# Patient Record
Sex: FEMALE | Race: WHITE | Employment: OTHER | URBAN - NONMETROPOLITAN AREA
[De-identification: names, ages, dates, MRNs, and addresses within clinical notes are randomized per-mention and may not be internally consistent; named-entity substitution may affect disease eponyms.]

---

## 2018-02-20 ENCOUNTER — HOSPITAL ENCOUNTER (OUTPATIENT)
Dept: WOMENS IMAGING | Age: 58
Discharge: HOME | End: 2018-02-20
Payer: COMMERCIAL

## 2018-02-20 ENCOUNTER — HOSPITAL ENCOUNTER (OUTPATIENT)
Dept: MAMMOGRAPHY | Age: 58
Discharge: HOME | End: 2018-02-20
Payer: COMMERCIAL

## 2018-02-20 DIAGNOSIS — Z12.31 VISIT FOR SCREENING MAMMOGRAM: ICD-10-CM

## 2018-02-20 DIAGNOSIS — Z12.39 BREAST CANCER SCREENING: ICD-10-CM

## 2018-02-20 PROCEDURE — 3209999900 MAM COMPARISON OF OUTSIDE IMAGES

## 2018-02-20 PROCEDURE — 77067 SCR MAMMO BI INCL CAD: CPT

## 2018-10-18 ENCOUNTER — HOSPITAL ENCOUNTER (OUTPATIENT)
Age: 58
Discharge: HOME | End: 2018-10-18
Payer: COMMERCIAL

## 2018-10-18 ENCOUNTER — HOSPITAL ENCOUNTER (OUTPATIENT)
Dept: GENERAL RADIOLOGY | Age: 58
Discharge: HOME | End: 2018-10-18
Payer: COMMERCIAL

## 2018-10-18 DIAGNOSIS — M79.81 NONTRAUMATIC HEMATOMA OF SOFT TISSUE: ICD-10-CM

## 2018-10-18 PROCEDURE — 73130 X-RAY EXAM OF HAND: CPT

## 2019-01-11 RX ORDER — BETAMETHASONE DIPROPIONATE 0.5 MG/G
CREAM TOPICAL 2 TIMES DAILY
COMMUNITY
End: 2021-08-05

## 2019-01-11 RX ORDER — LOSARTAN POTASSIUM AND HYDROCHLOROTHIAZIDE 12.5; 5 MG/1; MG/1
1 TABLET ORAL DAILY
COMMUNITY
End: 2022-02-21 | Stop reason: SDUPTHER

## 2019-01-15 ENCOUNTER — INITIAL CONSULT (OUTPATIENT)
Dept: PULMONOLOGY | Age: 59
End: 2019-01-15
Payer: COMMERCIAL

## 2019-01-15 VITALS
HEIGHT: 64 IN | BODY MASS INDEX: 26.6 KG/M2 | OXYGEN SATURATION: 99 % | WEIGHT: 155.8 LBS | SYSTOLIC BLOOD PRESSURE: 130 MMHG | RESPIRATION RATE: 16 BRPM | HEART RATE: 79 BPM | DIASTOLIC BLOOD PRESSURE: 82 MMHG

## 2019-01-15 DIAGNOSIS — G47.9 SLEEP DISTURBANCE: ICD-10-CM

## 2019-01-15 DIAGNOSIS — I10 ESSENTIAL HYPERTENSION: ICD-10-CM

## 2019-01-15 DIAGNOSIS — G47.10 HYPERSOMNIA: Primary | ICD-10-CM

## 2019-01-15 DIAGNOSIS — R06.89 SLEEP RELATED CHOKING SENSATION: ICD-10-CM

## 2019-01-15 DIAGNOSIS — R06.83 SNORING: ICD-10-CM

## 2019-01-15 PROCEDURE — 99203 OFFICE O/P NEW LOW 30 MIN: CPT | Performed by: INTERNAL MEDICINE

## 2019-01-29 ENCOUNTER — HOSPITAL ENCOUNTER (OUTPATIENT)
Dept: SLEEP CENTER | Age: 59
Discharge: HOME | End: 2019-01-31
Payer: COMMERCIAL

## 2019-01-29 VITALS — SYSTOLIC BLOOD PRESSURE: 124 MMHG | DIASTOLIC BLOOD PRESSURE: 72 MMHG

## 2019-01-29 DIAGNOSIS — R06.89 SLEEP RELATED CHOKING SENSATION: ICD-10-CM

## 2019-01-29 DIAGNOSIS — I10 ESSENTIAL HYPERTENSION: ICD-10-CM

## 2019-01-29 DIAGNOSIS — R06.83 SNORING: ICD-10-CM

## 2019-01-29 DIAGNOSIS — G47.9 SLEEP DISTURBANCE: ICD-10-CM

## 2019-01-29 DIAGNOSIS — G47.10 HYPERSOMNIA: ICD-10-CM

## 2019-01-29 PROCEDURE — 95806 SLEEP STUDY UNATT&RESP EFFT: CPT

## 2019-02-04 LAB — STATUS: NORMAL

## 2019-03-05 ENCOUNTER — OFFICE VISIT (OUTPATIENT)
Dept: PULMONOLOGY | Age: 59
End: 2019-03-05
Payer: COMMERCIAL

## 2019-03-05 VITALS
BODY MASS INDEX: 26.6 KG/M2 | WEIGHT: 155.8 LBS | HEART RATE: 82 BPM | DIASTOLIC BLOOD PRESSURE: 62 MMHG | OXYGEN SATURATION: 100 % | SYSTOLIC BLOOD PRESSURE: 102 MMHG | HEIGHT: 64 IN

## 2019-03-05 DIAGNOSIS — F51.02 ADJUSTMENT INSOMNIA: Primary | ICD-10-CM

## 2019-03-05 PROCEDURE — 99213 OFFICE O/P EST LOW 20 MIN: CPT | Performed by: INTERNAL MEDICINE

## 2019-03-05 RX ORDER — ZOLPIDEM TARTRATE 5 MG/1
5 TABLET ORAL NIGHTLY PRN
Qty: 30 TABLET | Refills: 0 | Status: SHIPPED | OUTPATIENT
Start: 2019-03-05 | End: 2019-04-04

## 2019-07-16 ENCOUNTER — HOSPITAL ENCOUNTER (OUTPATIENT)
Dept: MAMMOGRAPHY | Age: 59
Discharge: HOME | End: 2019-07-16
Payer: COMMERCIAL

## 2019-07-16 DIAGNOSIS — Z12.39 BREAST CANCER SCREENING: ICD-10-CM

## 2019-07-16 PROCEDURE — 77067 SCR MAMMO BI INCL CAD: CPT

## 2021-01-22 ENCOUNTER — HOSPITAL ENCOUNTER (OUTPATIENT)
Dept: PHYSICAL THERAPY | Age: 61
Setting detail: THERAPIES SERIES
Discharge: HOME | End: 2021-01-22
Payer: COMMERCIAL

## 2021-01-22 PROCEDURE — 97161 PT EVAL LOW COMPLEX 20 MIN: CPT

## 2021-01-22 NOTE — FLOWSHEET NOTE
11/15/2020 and fell after tripping on speed bump in road. She felt okay initially and continued running. For about 2 weeks she continued her routine and then the pain progressively got worse. Pain is just in upper right leg. Rested, which did not work. Then went to chiropractor which did not help. After that she went to Dr. June Jones. He does not believe she has a pinched nerve. He believes pain is coming from right hip and may be a sprain or a tear. Has had xrays completed of back which looks good. Also had xray of right hip which looked good. Is going to have an MRI of right hip, but has not heard back about when it is. Is on an antiinflammatory which has helped. Social/Functional History and Current Status:  Medications and Allergies have been reviewed and are listed on Medical History Questionnaire. Ruiz Arce lives with spouse in a multiple floor home with ability to complete ADL's on main floor with stairs and a handrail to enter. Task Previous Current   ADLs  Independent Independent   IADL's Independent Independent   Ambulation Independent Independent   Transfers Independent Independent   Recreation Independent Independent   Community Integration Independent Independent   Driving Active  Active    Work Retired  Retired     OBJECTIVE:    Pain: 9/10 right upper leg, sometimes having pain in right low back   Palpation Some tightness along lateral right leg. No pain with palpation. Only having pain with certain movements. Observation Hips levels in standing   Posture Slight forward rounded shoulders, slight increased thoracic kyphosis       Range of Motion Bilateral hip=WFL. Lumbar flexion=WFL, extension=50% loss with increased pain. Strength Bilateral hip strength=5/5, Core strength=minimal cues needed for abdominal bracing.    Coordination No difficulties   Sensation No numbness or tingling   Bed Mobility NA   Transfers Independent   Ambulation Indepenedent Stairs Independent       Special Tests LEFS         TREATMENT   Precautions: High BP, Arthritis   Pain: 9/10 right upper leg, sometimes right low back    X in shaded column indicates activity completed today   Modalities Parameters/  Location  Notes                     Manual Therapy Time/Technique  Notes                     Exercise/Intervention   Notes                                                                                  Isaias Khris Disability Scale for Low Back Pain   I stay at home most of the time because of the pain in my back. No   I change position frequently to try and make my back comfortable. No   I walk more slowly than usual because of the pain in my back. Yes   Because of the pain in my back, I am not doing any of the jobs that I usually do around the house. No   Because of the pain in my back, I use a handrail to get upstairs. No   Because of the pain in my back, I lie down to rest more often. No   Because of the pain in my back, I have to hold onto something to get out of a reclining chair. No   Because of the pain in my back, I ask other people to do things for me. No   I get dressed more slowly than usual because of the pain in my back. No   I only stand up for short periods of time because of the pain in my back. No   Because of the pain in my back, I try not to bend or kneel down. No   I find it difficult to get out of a chair because of the pain in my back. No   My back hurts most of the time. No   I find it difficult to turn over in bed because of the pain in my back. Yes   My appetite is not very good because of the pain in my back. No   I have trouble putting on my socks (or stockings) because of the pain in my back. No   I only walk short distances because of the pain in my back. No   I sleep less because of the pain in my back. No   Because of the pain in my back, I get dressed with help from someone else. No   I sit down most of the day because of the pain in my back.  No I avoid heavy jobs around the house because of the pain in my back. Yes   Because of the pain in my back, I am more irritable and bad tempered with people. No   Because of the pain in my back, I go upstairs more slowly than usual. No   I stay in bed most of the time because of the pain in my back. No   TOTAL NUMBER OF YES RESPONSES 3/24        Specific Interventions Next Treatment: Right LE and Right LB stretching (SKTC, hamstring, DKTC, piriformis, LTR, IT band stretch), modalities as needed for pain in right upper thigh and right low back, SLR, supine hip abduction    Activity/Treatment Tolerance:  [x]  Patient tolerated treatment well  []  Patient limited by fatigue  []  Patient limited by pain   []  Patient limited by medical complications  []  Other:     Assessment: Kathy presents to therapy with right upper thigh and right leg pain. She will benefit from therapy for stretching, gentle strengthening, pain relief. Her son lives in [facility] and is due to have a child on January 29th. When the baby is born, Kathy will be in [facility] for a week. She will call and let us know. Body Structures/Functions/Activity Limitations: impaired activity tolerance, impaired strength, pain and abnormal gait  Prognosis: good    GOALS:  Patient Goal: \"To run again. \"    Short Term Goals:  Time Frame: 4 weeks  1. Improve LEFS to 55 or greater to assist with return to running. 2.  Improve Isaias Mitcheal Second to 2/24 or less to assist with sleeping at night. 3. Decrease pain in right upper leg to 7/10 at high to assist with ambulation in mornings. 4.  Patient to report right low back pain no more than a 5/10 to assist with sleeping at night. 5.  Increase core strength with no cues needed for abdominal bracing to assist with housework. 6.  Increase lumbar ROM to <25% loss to assist with working out. Long Term Goals:  Time Frame: 12 weeks  1. Independent with HEP and with progression to assist with decreasing pain. Patient Education:   [x]  HEP/Education Completed: Plan of Care, Goals   [address] Access Code:  []  No new Education completed  []  Reviewed Prior HEP      []  Patient verbalized and/or demonstrated understanding of education provided. []  Patient unable to verbalize and/or demonstrate understanding of education provided. Will continue education. [x]  Barriers to learning: none    PLAN:  Treatment Recommendations: Strengthening, Functional Mobility Training, Transfer Training, Gait Training, Pain Management, Home Exercise Program, Patient Education, Aquatics and Modalities    [x]  Plan of care initiated. Plan to see patient 2-3 times per week for 12 weeks to address the treatment planned outlined above.   []  Continue with current plan of care  []  Modify plan of care as follows:    []  Hold pending physician visit  []  Discharge    Time In 1455   Time Out 1550   Timed Code Minutes: 0 min   Total Treatment Time: 55 min       Electronically Signed by: Javed Burnett

## 2021-01-25 ENCOUNTER — HOSPITAL ENCOUNTER (OUTPATIENT)
Dept: PHYSICAL THERAPY | Age: 61
Setting detail: THERAPIES SERIES
Discharge: HOME | End: 2021-01-25
Payer: COMMERCIAL

## 2021-01-25 PROCEDURE — 97035 APP MDLTY 1+ULTRASOUND EA 15: CPT

## 2021-01-25 PROCEDURE — 97110 THERAPEUTIC EXERCISES: CPT

## 2021-01-25 NOTE — PROGRESS NOTES
[address] [facility]  PHYSICAL THERAPY  [] EVALUATION  [x] DAILY NOTE (LAND) [] DAILY NOTE (AQUATIC ) [] PROGRESS NOTE [] DISCHARGE NOTE    [x] OUTPATIENT REHABILITATION CENTER [facility]   [] [place] [address]    [] [facility]   [] Marivel Sainz    Date: 2021  Patient Name:  Emily Sandoval  : 1960  MRN: [id number]  CSN: [id number]    Referring Practitioner Viraj Clay DO   Diagnosis Other intervertebral disc displacement, lumbar region [M51.26]  Radiculopathy, lumbar region [M54.16]    Treatment Diagnosis Lumbago   Date of Evaluation 21    Additional Pertinent History High BP, Arthritis      Functional Outcome Measure Used LEFS   Functional Outcome Score 49 (21)       Insurance: Primary: Payor: Evelia Pyle 150 /  /  / ,   Secondary:    Authorization Information: Unlimited visits based on medical necessity. Aquatics and modalities are covered including massage. Telehealth is not covered. Visit # 2, 2/10 for progress note   Visits Allowed: Unlimited visits based on medical necessity. Recertification Date:    Physician Follow-Up: Returns to Dr. Christina Bowen the end of February. Physician Orders: Exercises, Spine Stabilization, Lumbar Spine Training, Modalities as needed. History of Present Illness: Right hip pain since 11/15/2020     SUBJECTIVE: Dio Bae reports had some tightness when she got out of car to walk into therapy. TREATMENT   Precautions: High BP, Arthritis   Pain: 0/10 right upper leg, sometimes right low back. Tightness in right hip.       X in shaded column indicates activity completed today   Modalities Parameters/  Location  Notes   Ultrasound to right hip with patient lying in left sidelying 3MHz, 50%, 1.0 [id number] X                Manual Therapy Time/Technique  Notes                     Exercise/Intervention   Notes   SKTC, supine hamstring, piriformis, DKTC, diagonal KTC, sidelying IT band stretch 3 x15 sec X    Standing IT band stretch 3 x15 seconds X                                                                       Specific Interventions Next Treatment: Right LE and Right LB stretching (SKTC, hamstring, DKTC, piriformis, LTR, IT band stretch), modalities as needed for pain in right upper thigh and right low back, SLR, supine hip abduction    Activity/Treatment Tolerance:  [x]  Patient tolerated treatment well  []  Patient limited by fatigue  []  Patient limited by pain   []  Patient limited by medical complications  []  Other:     Assessment: To Leader reported significant decreased stiffness in right hip after ultrasound. Body Structures/Functions/Activity Limitations: impaired activity tolerance, impaired strength, pain and abnormal gait  Prognosis: good    GOALS:  Patient Goal: \"To run again. \"    Short Term Goals:  Time Frame: 4 weeks  1. Improve LEFS to 55 or greater to assist with return to running. 2.  Improve Isaias Ok Martinez to 2/24 or less to assist with sleeping at night. 3. Decrease pain in right upper leg to 7/10 at high to assist with ambulation in mornings. 4.  Patient to report right low back pain no more than a 5/10 to assist with sleeping at night. 5.  Increase core strength with no cues needed for abdominal bracing to assist with housework. 6.  Increase lumbar ROM to <25% loss to assist with working out. Long Term Goals:  Time Frame: 12 weeks  1. Independent with HEP and with progression to assist with decreasing pain.         Patient Education:   [x]  HEP/Education Completed: HEP  Medbridge Access Code:Access Code: [email or web address]        Exercises   Supine Single Knee to Chest Stretch - 3 reps - 3 sets - 15 hold - 1x daily - 7x weekly   Supine Double Knee to Chest - 3 reps - 3 sets - 15 hold - 1x daily - 7x weekly   Supine Hamstring Stretch - 3 reps - 3 sets - 15 hold - 1x daily - 7x weekly   Sidelying TFL Stretch - 3 reps - 3 sets - 15 hold - 1x daily - 7x weekly   Supine Piriformis Stretch - 3 reps - 3 sets - 15 hold - 1x daily - 7x weekly      []  No new Education completed  []  Reviewed Prior HEP      []  Patient verbalized and/or demonstrated understanding of education provided. []  Patient unable to verbalize and/or demonstrate understanding of education provided. Will continue education. [x]  Barriers to learning: none    PLAN:  Treatment Recommendations: Strengthening, Functional Mobility Training, Transfer Training, Gait Training, Pain Management, Home Exercise Program, Patient Education, Aquatics and Modalities    []  Plan of care initiated. Plan to see patient 2-3 times per week for 12 weeks to address the treatment planned outlined above.   [x]  Continue with current plan of care  []  Modify plan of care as follows:    []  Hold pending physician visit  []  Discharge    Time In 1130   Time Out 1210   Timed Code Minutes: 40 min   Total Treatment Time: 40 min       Electronically Signed by: Rubina Sepulveda

## 2021-01-28 ENCOUNTER — HOSPITAL ENCOUNTER (OUTPATIENT)
Dept: PHYSICAL THERAPY | Age: 61
Setting detail: THERAPIES SERIES
End: 2021-01-28
Payer: COMMERCIAL

## 2021-02-08 ENCOUNTER — HOSPITAL ENCOUNTER (OUTPATIENT)
Dept: PHYSICAL THERAPY | Age: 61
Setting detail: THERAPIES SERIES
Discharge: HOME | End: 2021-02-08
Payer: COMMERCIAL

## 2021-02-08 PROCEDURE — 97110 THERAPEUTIC EXERCISES: CPT

## 2021-02-08 PROCEDURE — 97035 APP MDLTY 1+ULTRASOUND EA 15: CPT

## 2021-02-08 NOTE — PROGRESS NOTES
[address] [facility]  PHYSICAL THERAPY  [] EVALUATION  [x] DAILY NOTE (LAND) [] DAILY NOTE (AQUATIC ) [] PROGRESS NOTE [] DISCHARGE NOTE    [x] OUTPATIENT REHABILITATION CENTER - [place]   [] [address]    [] [facility]   [] Alfredito Figueroa    Date: 2021  Patient Name:  Gladies Gaucher  : 1960  MRN: [id number]  CSN: [id number]    Referring Practitioner Crystal Gray DO   Diagnosis Other intervertebral disc displacement, lumbar region [M51.26]  Radiculopathy, lumbar region [M54.16]    Treatment Diagnosis Lumbago   Date of Evaluation 21    Additional Pertinent History High BP, Arthritis      Functional Outcome Measure Used LEFS   Functional Outcome Score 49 (21)       Insurance: Primary: Payor: Shaylee Christensen /  /  / ,   Secondary:    Authorization Information: Unlimited visits based on medical necessity. Aquatics and modalities are covered including massage. Telehealth is not covered. Visit # 3, 3/10 for progress note   Visits Allowed: Unlimited visits based on medical necessity. Recertification Date:    Physician Follow-Up: Returns to Dr. Diamante Anderson the end of February. Physician Orders: Exercises, Spine Stabilization, Lumbar Spine Training, Modalities as needed. History of Present Illness: Right hip pain since 11/15/2020     SUBJECTIVE: Today is Loretta's first visit back to therapy after going to [facility] for birth of grandchild  Reports pain is always worse in the mornings. Normally 7-8/10 in mornings. Is scheduled for an MRI tomorrow for right hip. TREATMENT   Precautions: High BP, Arthritis   Pain: 0/10 right upper leg, sometimes right low back. Tightness in right hip.       X in shaded column indicates activity completed today   Modalities Parameters/  Location  Notes   Ultrasound to right hip with patient lying in left sidelying 3MHz, 50%, 1.0 [id number] X                Manual Therapy Time/Technique  Notes Exercise/Intervention   Notes   SKTC, supine hamstring, piriformis, DKTC, diagonal KTC, sidelying IT band stretch, LTR 3 x15 sec X    Standing IT band stretch 3 x15 seconds X    Right: SLR, supine hip abduction x10  X    Hooklying hip adduction with foam roll between knees x10  X                                                         Specific Interventions Next Treatment: Right LE and Right LB stretching (SKTC, hamstring, DKTC, piriformis, LTR, IT band stretch), modalities as needed for pain in right upper thigh and right low back, SLR, supine hip abduction    Activity/Treatment Tolerance:  [x]  Patient tolerated treatment well  []  Patient limited by fatigue  []  Patient limited by pain   []  Patient limited by medical complications  []  Other:     Assessment: Dio Bae reports ultrasound feels good and is relaxing. No pain throughout session. Body Structures/Functions/Activity Limitations: impaired activity tolerance, impaired strength, pain and abnormal gait  Prognosis: good    GOALS:  Patient Goal: \"To run again. \"    Short Term Goals:  Time Frame: 4 weeks  1. Improve LEFS to 55 or greater to assist with return to running. 2.  Improve Isaias Terrance Horde to 2/24 or less to assist with sleeping at night. 3. Decrease pain in right upper leg to 7/10 at high to assist with ambulation in mornings. 4.  Patient to report right low back pain no more than a 5/10 to assist with sleeping at night. 5.  Increase core strength with no cues needed for abdominal bracing to assist with housework. 6.  Increase lumbar ROM to <25% loss to assist with working out. Long Term Goals:  Time Frame: 12 weeks  1. Independent with HEP and with progression to assist with decreasing pain.         Patient Education:   [x]  HEP/Education Completed: HEP-added in LTR and hip adduction isometric  Medbridge Access Code:Access Code: [email or web address]        Exercises   Supine Single Knee to Chest Stretch - 3 reps - 3 sets - 15 hold - 1x daily - 7x weekly   Supine Double Knee to Chest - 3 reps - 3 sets - 15 hold - 1x daily - 7x weekly   Supine Hamstring Stretch - 3 reps - 3 sets - 15 hold - 1x daily - 7x weekly   Sidelying TFL Stretch - 3 reps - 3 sets - 15 hold - 1x daily - 7x weekly   Supine Piriformis Stretch - 3 reps - 3 sets - 15 hold - 1x daily - 7x weekly      []  No new Education completed  []  Reviewed Prior HEP      []  Patient verbalized and/or demonstrated understanding of education provided. []  Patient unable to verbalize and/or demonstrate understanding of education provided. Will continue education. [x]  Barriers to learning: none    PLAN:  Treatment Recommendations: Strengthening, Functional Mobility Training, Transfer Training, Gait Training, Pain Management, Home Exercise Program, Patient Education, Aquatics and Modalities    []  Plan of care initiated. Plan to see patient 2-3 times per week for 12 weeks to address the treatment planned outlined above.   [x]  Continue with current plan of care  []  Modify plan of care as follows:    []  Hold pending physician visit  []  Discharge    Time In 1045   Time Out 1125   Timed Code Minutes: 40 min   Total Treatment Time: 40 min       Electronically Signed by: Maxim Mcknight

## 2021-02-09 ENCOUNTER — HOSPITAL ENCOUNTER (OUTPATIENT)
Dept: MRI IMAGING | Age: 61
Discharge: HOME | End: 2021-02-09
Payer: COMMERCIAL

## 2021-02-09 DIAGNOSIS — M25.551 RIGHT HIP PAIN: ICD-10-CM

## 2021-02-09 PROCEDURE — 73721 MRI JNT OF LWR EXTRE W/O DYE: CPT

## 2021-02-11 ENCOUNTER — HOSPITAL ENCOUNTER (OUTPATIENT)
Dept: PHYSICAL THERAPY | Age: 61
Setting detail: THERAPIES SERIES
Discharge: HOME | End: 2021-02-11
Payer: COMMERCIAL

## 2021-02-11 PROCEDURE — 97035 APP MDLTY 1+ULTRASOUND EA 15: CPT

## 2021-02-11 PROCEDURE — 97110 THERAPEUTIC EXERCISES: CPT

## 2021-02-11 NOTE — PROGRESS NOTES
[address] [facility]  PHYSICAL THERAPY  [] EVALUATION  [x] DAILY NOTE (LAND) [] DAILY NOTE (AQUATIC ) [] PROGRESS NOTE [] DISCHARGE NOTE    [x] OUTPATIENT REHABILITATION [facility]   [] [place] [address]    [] [facility]   [] Clista Rinne    Date: 2021  Patient Name:  Pancho Multani  : 1960  MRN: [id number]  CSN: [id number]    Referring Practitioner Mo Wooten DO   Diagnosis Other intervertebral disc displacement, lumbar region [M51.26]  Radiculopathy, lumbar region [M54.16]    Treatment Diagnosis Lumbago   Date of Evaluation 21    Additional Pertinent History High BP, Arthritis      Functional Outcome Measure Used LEFS   Functional Outcome Score 49 (21)       Insurance: Primary: Payor: Yoni Araiza /  /  / ,   Secondary:    Authorization Information: Unlimited visits based on medical necessity. Aquatics and modalities are covered including massage. Telehealth is not covered. Visit # 4, 4/10 for progress note   Visits Allowed: Unlimited visits based on medical necessity. Recertification Date:    Physician Follow-Up: Returns to Dr. Bismark Huertas the end of February. Physician Orders: Exercises, Spine Stabilization, Lumbar Spine Training, Modalities as needed. History of Present Illness: Right hip pain since 11/15/2020     SUBJECTIVE:  Patient states she thought the ultrasound last session did help a little last therapy session. Patient did have an MRI of her Right hip on Tuesday. Morning continue to be worse but feels better after she stretches. TREATMENT   Precautions: High BP, Arthritis   Pain: No pain. Tightness in right hip.       X in shaded column indicates activity completed today   Modalities Paramers/  Location  Notes   Ultrasound to right hip with patient lying in left sidelying with pillow between knees 3MHz, 50%, 1.0 [id number] X                Manual Therapy Time/Technique  Notes Exercise/Intervention   Notes   SKTC, supine hamstring, piriformis, DKTC, diagonal KTC, sidelying IT band stretch, LTR 3x each  x15 seconds X    Standing IT band stretch 3 x15 seconds X    Right: SLR, sidelying hip abduction 10x each  X    Hooklying hip adduction with ball between knees 10x 5 seconds X    Bridging with ball between knees, Bridging with orange band around thighs 10x 5 seconds X    Left sidelying for Right clamshells with orange band at thighs 10x 5 seconds X                                           Specific Interventions Next Treatment: Right LE and Right LB stretching (SKTC, hamstring, DKTC, piriformis, LTR, IT band stretch), modalities as needed for pain in right upper thigh and right low back, SLR, supine hip abduction    Activity/Treatment Tolerance:  [x]  Patient tolerated treatment well  []  Patient limited by fatigue  []  Patient limited by pain   []  Patient limited by medical complications  []  Other:     Assessment: Continued ultrasound to further decrease pain and assist in healing process. Able to progress strengthening exercises without increased pain. GOALS:  Patient Goal: \"To run again. \"    Short Term Goals:  Time Frame: 4 weeks  1. Improve LEFS to 55 or greater to assist with return to running. 2.  Improve Isaias Donia Gallant to 2/24 or less to assist with sleeping at night. 3. Decrease pain in right upper leg to 7/10 at high to assist with ambulation in mornings. 4.  Patient to report right low back pain no more than a 5/10 to assist with sleeping at night. 5.  Increase core strength with no cues needed for abdominal bracing to assist with housework. 6.  Increase lumbar ROM to <25% loss to assist with working out. Long Term Goals:  Time Frame: 12 weeks  1. Independent with HEP and with progression to assist with decreasing pain. Patient Education:   [x]  HEP/Education Completed: Monitor pain after progression of exercises and ultrasound treatment.    Gabbie Chauhan

## 2021-02-15 ENCOUNTER — HOSPITAL ENCOUNTER (OUTPATIENT)
Dept: PHYSICAL THERAPY | Age: 61
Setting detail: THERAPIES SERIES
Discharge: HOME | End: 2021-02-15
Payer: COMMERCIAL

## 2021-02-15 PROCEDURE — 97035 APP MDLTY 1+ULTRASOUND EA 15: CPT

## 2021-02-15 PROCEDURE — 97110 THERAPEUTIC EXERCISES: CPT

## 2021-02-15 NOTE — PROGRESS NOTES
[address] [facility]  PHYSICAL THERAPY  [] EVALUATION  [x] DAILY NOTE (LAND) [] DAILY NOTE (AQUATIC ) [] PROGRESS NOTE [] DISCHARGE NOTE    [x] OUTPATIENT REHABILITATION [facility]   [] [place] [address]    [] [facility]   [] Marivel Sainz    Date: 2/15/2021  Patient Name:  Emily Sandoval  : 1960  MRN: [id number]  CSN: [id number]    Referring Practitioner Viraj Clay DO   Diagnosis Other intervertebral disc displacement, lumbar region [M51.26]  Radiculopathy, lumbar region [M54.16]    Treatment Diagnosis Lumbago   Date of Evaluation 21    Additional Pertinent History High BP, Arthritis      Functional Outcome Measure Used LEFS   Functional Outcome Score 49 (21)       Insurance: Primary: Payor: Ryan Bosch /  /  / ,   Secondary:    Authorization Information: Unlimited visits based on medical necessity. Aquatics and modalities are covered including massage. Telehealth is not covered. Visit # 5, 5/10 for progress note   Visits Allowed: Unlimited visits based on medical necessity. Recertification Date: 3/71/30   Physician Follow-Up: Returns to Dr. Christina Bowen the end of February. Physician Orders: Exercises, Spine Stabilization, Lumbar Spine Training, Modalities as needed. History of Present Illness: Right hip pain since 11/15/2020     SUBJECTIVE:  Patient states she thinks she has a shin splint pain right shin when she kneels on it. Pain in right hip is only in the morning. Feels ultrasound is helping a little bit. TREATMENT   Precautions: High BP, Arthritis   Pain: No pain. Tightness in right hip.       X in shaded column indicates activity completed today   Modalities Paramers/  Location  Notes   Ultrasound to right hip with patient lying in left sidelying with pillow between knees 3MHz, 50%, 1.0 [id number]                Manual Therapy Time/Technique  Notes                     Exercise/Intervention   Notes   SKTC, supine hamstring, piriformis, DKTC, diagonal KTC, sidelying IT band stretch, LTR 3x each  x15 seconds X    Standing IT band stretch 3 x15 seconds X    Right: SLR, sidelying hip abduction 10x each  X    Hooklying hip adduction with ball between knees 10x 5 seconds X    Bridging with ball between knees, Bridging with orange band around thighs 10x 5 seconds X    Left sidelying for Right clamshells with orange band at thighs 10x 5 seconds X                                           Specific Interventions Next Treatment: Right LE and Right LB stretching (SKTC, hamstring, DKTC, piriformis, LTR, IT band stretch), modalities as needed for pain in right upper thigh and right low back, SLR, supine hip abduction    Activity/Treatment Tolerance:  [x]  Patient tolerated treatment well  []  Patient limited by fatigue  []  Patient limited by pain   []  Patient limited by medical complications  []  Other:     Assessment: Discussed making a log of pain levels when she wakes up in morning to see if overall pain decreases. GOALS:  Patient Goal: \"To run again. \"    Short Term Goals:  Time Frame: 4 weeks  1. Improve LEFS to 55 or greater to assist with return to running. 2.  Improve Isaias Nancy Mould to 2/24 or less to assist with sleeping at night. 3. Decrease pain in right upper leg to 7/10 at high to assist with ambulation in mornings. 4.  Patient to report right low back pain no more than a 5/10 to assist with sleeping at night. 5.  Increase core strength with no cues needed for abdominal bracing to assist with housework. 6.  Increase lumbar ROM to <25% loss to assist with working out. Long Term Goals:  Time Frame: 12 weeks  1. Independent with HEP and with progression to assist with decreasing pain. Patient Education:   []  HEP/Education Completed: Monitor pain after progression of exercises and ultrasound treatment.    Stretch Access Code:Access Code: [email or web address]    Exercises   Supine Single Knee to Chest Stretch - 3 reps - 3 sets - 15 hold - 1x daily - 7x weekly   Supine Double Knee to Chest - 3 reps - 3 sets - 15 hold - 1x daily - 7x weekly   Supine Hamstring Stretch - 3 reps - 3 sets - 15 hold - 1x daily - 7x weekly   Sidelying TFL Stretch - 3 reps - 3 sets - 15 hold - 1x daily - 7x weekly   Supine Piriformis Stretch - 3 reps - 3 sets - 15 hold - 1x daily - 7x weekly     [x]  No new Education completed  [x]  Reviewed Prior HEP      []  Patient verbalized and/or demonstrated understanding of education provided. []  Patient unable to verbalize and/or demonstrate understanding of education provided. Will continue education. [x]  Barriers to learning: none    PLAN:  Treatment Recommendations: Strengthening, Functional Mobility Training, Transfer Training, Gait Training, Pain Management, Home Exercise Program, Patient Education, Aquatics and Modalities    []  Plan of care initiated. Plan to see patient 2-3 times per week for 12 weeks to address the treatment planned outlined above.   [x]  Continue with current plan of care  []  Modify plan of care as follows:    []  Hold pending physician visit  []  Discharge    Time In 1145   Time Out 1225   Timed Code Minutes: 40 min   Total Treatment Time: 40 min       Electronically Signed by: Rowdy Bautista

## 2021-02-18 ENCOUNTER — APPOINTMENT (OUTPATIENT)
Dept: PHYSICAL THERAPY | Age: 61
End: 2021-02-18
Payer: COMMERCIAL

## 2021-02-22 ENCOUNTER — HOSPITAL ENCOUNTER (OUTPATIENT)
Dept: PHYSICAL THERAPY | Age: 61
Setting detail: THERAPIES SERIES
Discharge: HOME | End: 2021-02-22
Payer: COMMERCIAL

## 2021-02-22 PROCEDURE — 97124 MASSAGE THERAPY: CPT

## 2021-02-22 PROCEDURE — 97035 APP MDLTY 1+ULTRASOUND EA 15: CPT

## 2021-02-22 NOTE — PROGRESS NOTES
[address] [facility]  PHYSICAL THERAPY  [] EVALUATION  [x] DAILY NOTE (LAND) [] DAILY NOTE (AQUATIC ) [] PROGRESS NOTE [] DISCHARGE NOTE    [x] OUTPATIENT REHABILITATION CENTER - [place]   [] [place] [address]    [] [facility]   [] [facility]    Date: 2021  Patient Name:  Delfino Homans  : 1960  MRN: [id number]  CSN: [id number]    Referring Practitioner Shaquille Rodriguez DO   Diagnosis Other intervertebral disc displacement, lumbar region [M51.26]  Radiculopathy, lumbar region [M54.16]    Treatment Diagnosis Lumbago   Date of Evaluation 21    Additional Pertinent History High BP, Arthritis      Functional Outcome Measure Used LEFS   Functional Outcome Score 49 (21)       Insurance: Primary: Payor: Stephani Gallatin /  /  / ,   Secondary:    Authorization Information: Unlimited visits based on medical necessity. Aquatics and modalities are covered including massage. Telehealth is not covered. Visit # 6, 6/10 for progress note   Visits Allowed: Unlimited visits based on medical necessity. Recertification Date:    Physician Follow-Up: Returns to Dr. Alfreda Dowd the end of February. Physician Orders: Exercises, Spine Stabilization, Lumbar Spine Training, Modalities as needed. History of Present Illness: Right hip pain since 11/15/2020     SUBJECTIVE:  Patient reports overall she is not feeling any better. Does have some relief with ultrasound, but it does not last.  Has had MRI completed and mailed disc to doctor. TREATMENT   Precautions: High BP, Arthritis   Pain: No pain. Tightness in right hip.       X in shaded column indicates activity completed today   Modalities Paramers/  Location  Notes   Ultrasound to right hip with patient lying in left sidelying with pillow between knees 3MHz, 50%, 1.0 [id number]                Manual Therapy Time/Technique  Notes   Massage to right hip with patient lying in left sidelying 15 minutes X Exercise/Intervention   Notes   SKTC, supine hamstring, piriformis, DKTC, diagonal KTC, sidelying IT band stretch, LTR 3x each  x15 seconds     Standing IT band stretch 3 x15 seconds     Right: SLR, sidelying hip abduction 10x each      Hooklying hip adduction with ball between knees 10x 5 seconds     Bridging with ball between knees, Bridging with orange band around thighs 10x 5 seconds     Left sidelying for Right clamshells with orange band at thighs 10x 5 seconds                                            Specific Interventions Next Treatment: Right LE and Right LB stretching (SKTC, hamstring, DKTC, piriformis, LTR, IT band stretch), modalities as needed for pain in right upper thigh and right low back, SLR, supine hip abduction    Activity/Treatment Tolerance:  [x]  Patient tolerated treatment well  []  Patient limited by fatigue  []  Patient limited by pain   []  Patient limited by medical complications  []  Other:     Assessment: Held stretches today due to Eddie Patricia reporting no change overall since beginning therapy. Completed ultrasound and massage only due to pain not changing. GOALS:  Patient Goal: \"To run again. \"    Short Term Goals:  Time Frame: 4 weeks  1. Improve LEFS to 55 or greater to assist with return to running. 2.  Improve Isaias Melendrez Jeanmarie to 2/24 or less to assist with sleeping at night. 3. Decrease pain in right upper leg to 7/10 at high to assist with ambulation in mornings. 4.  Patient to report right low back pain no more than a 5/10 to assist with sleeping at night. 5.  Increase core strength with no cues needed for abdominal bracing to assist with housework. 6.  Increase lumbar ROM to <25% loss to assist with working out. Long Term Goals:  Time Frame: 12 weeks  1. Independent with HEP and with progression to assist with decreasing pain.         Patient Education:   [x]  HEP/Education Completed: Purpose of massage  Medbridge Access Code:Access Code: [facility] [place]    Exercises

## 2021-02-25 ENCOUNTER — APPOINTMENT (OUTPATIENT)
Dept: PHYSICAL THERAPY | Age: 61
End: 2021-02-25
Payer: COMMERCIAL

## 2021-03-01 ENCOUNTER — HOSPITAL ENCOUNTER (OUTPATIENT)
Dept: PHYSICAL THERAPY | Age: 61
Setting detail: THERAPIES SERIES
Discharge: HOME | End: 2021-03-01
Payer: COMMERCIAL

## 2021-03-01 PROCEDURE — 97110 THERAPEUTIC EXERCISES: CPT

## 2021-03-01 NOTE — DISCHARGE SUMMARY
[address] [facility]  PHYSICAL THERAPY  [] EVALUATION  [] DAILY NOTE (LAND) [] DAILY NOTE (AQUATIC ) [] PROGRESS NOTE [x] DISCHARGE NOTE    [x] OUTPATIENT REHABILITATION CENTER [facility]   [] [place] [address]    [] [facility]   [] Carry Minder    Date: 3/1/2021  Patient Name:  Angle Gaston  : 1960  MRN: [id number]  CSN: [id number]    Referring Practitioner Eneida Maldonado DO   Diagnosis Other intervertebral disc displacement, lumbar region [M51.26]  Radiculopathy, lumbar region [M54.16]    Treatment Diagnosis Lumbago   Date of Evaluation 21    Additional Pertinent History High BP, Arthritis      Functional Outcome Measure Used LEFS   Functional Outcome Score 49 (21); 51 at discharge on 3/1/21      Insurance: Primary: Payor: Evelia Pyle 150 /  /  / ,   Secondary:    Authorization Information: Unlimited visits based on medical necessity. Aquatics and modalities are covered including massage. Telehealth is not covered. Visit # 7,  for progress note   Visits Allowed: Unlimited visits based on medical necessity. Recertification Date:    Physician Follow-Up: Returns to Dr. Tenorio Kin the end of February. Physician Orders: Exercises, Spine Stabilization, Lumbar Spine Training, Modalities as needed. History of Present Illness: Right hip pain since 11/15/2020     SUBJECTIVE:  Patient reports she is not feeling any improvement in therapy. At times feels better, but has no carryover. Has had an MRI and has sent disc to doctor but does not know results yet. Isaias Khris Disability Scale for Low Back Pain   I stay at home most of the time because of the pain in my back. No   I change position frequently to try and make my back comfortable. No   I walk more slowly than usual because of the pain in my back. No   Because of the pain in my back, I am not doing any of the jobs that I usually do around the house.  No   Because of the pain in my back, I use a handrail to get upstairs. No   Because of the pain in my back, I lie down to rest more often. No   Because of the pain in my back, I have to hold onto something to get out of a reclining chair. No   Because of the pain in my back, I ask other people to do things for me. No   I get dressed more slowly than usual because of the pain in my back. Yes   I only stand up for short periods of time because of the pain in my back. No   Because of the pain in my back, I try not to bend or kneel down. No   I find it difficult to get out of a chair because of the pain in my back. No   My back hurts most of the time. No   I find it difficult to turn over in bed because of the pain in my back. Yes   My appetite is not very good because of the pain in my back. No   I have trouble putting on my socks (or stockings) because of the pain in my back. No   I only walk short distances because of the pain in my back. No   I sleep less because of the pain in my back. No   Because of the pain in my back, I get dressed with help from someone else. No   I sit down most of the day because of the pain in my back. No   I avoid heavy jobs around the house because of the pain in my back. Yes   Because of the pain in my back, I am more irritable and bad tempered with people. No   Because of the pain in my back, I go upstairs more slowly than usual. No   I stay in bed most of the time because of the pain in my back. No   TOTAL NUMBER OF YES RESPONSES 3/24        TREATMENT   Precautions: High BP, Arthritis   Pain: No pain. Tightness in right hip.       X in shaded column indicates activity completed today   Modalities Paramers/  Location  Notes   Ultrasound to right hip with patient lying in left sidelying with pillow between knees 3MHz, 50%, 1.0 [id number] X                Manual Therapy Time/Technique  Notes   Massage to right hip with patient lying in left sidelying 15 minutes X                Exercise/Intervention   Notes   SKTC, supine working out. MET: Lumbar ROM=WFL. Long Term Goals:  Time Frame: 12 weeks  1. Independent with HEP and with progression to assist with decreasing pain. MET: Completing HEP 2 times per day. Patient Education:   [x]  HEP/Education Completed: Continue with HEP, follow up with doctor. Sandra Access Code:Access Code: [email or web address]    Exercises   Supine Single Knee to Chest Stretch - 3 reps - 3 sets - 15 hold - 1x daily - 7x weekly   Supine Double Knee to Chest - 3 reps - 3 sets - 15 hold - 1x daily - 7x weekly   Supine Hamstring Stretch - 3 reps - 3 sets - 15 hold - 1x daily - 7x weekly   Sidelying TFL Stretch - 3 reps - 3 sets - 15 hold - 1x daily - 7x weekly   Supine Piriformis Stretch - 3 reps - 3 sets - 15 hold - 1x daily - 7x weekly     []  No new Education completed  [x]  Reviewed Prior HEP      []  Patient verbalized and/or demonstrated understanding of education provided. []  Patient unable to verbalize and/or demonstrate understanding of education provided. Will continue education. [x]  Barriers to learning: none    PLAN:  Treatment Recommendations: Strengthening, Functional Mobility Training, Transfer Training, Gait Training, Pain Management, Home Exercise Program, Patient Education, Aquatics and Modalities    []  Plan of care initiated. Plan to see patient 2-3 times per week for 12 weeks to address the treatment planned outlined above.   []  Continue with current plan of care  []  Modify plan of care as follows:    []  Hold pending physician visit  [x]  Discharge    Time In 1000   Time Out 1030   Timed Code Minutes: 30 min   Total Treatment Time: 30 min       Electronically Signed by: Sydnie Verma

## 2021-08-05 ENCOUNTER — OFFICE VISIT (OUTPATIENT)
Dept: FAMILY MEDICINE CLINIC | Age: 61
End: 2021-08-05
Payer: COMMERCIAL

## 2021-08-05 VITALS
OXYGEN SATURATION: 98 % | DIASTOLIC BLOOD PRESSURE: 86 MMHG | WEIGHT: 155 LBS | HEART RATE: 72 BPM | SYSTOLIC BLOOD PRESSURE: 118 MMHG | HEIGHT: 63 IN | BODY MASS INDEX: 27.46 KG/M2

## 2021-08-05 DIAGNOSIS — Z78.9 ADVISED ABOUT MANAGEMENT OF WEIGHT: ICD-10-CM

## 2021-08-05 DIAGNOSIS — Z13.220 SCREENING CHOLESTEROL LEVEL: ICD-10-CM

## 2021-08-05 DIAGNOSIS — I10 HYPERTENSION, UNSPECIFIED TYPE: Primary | ICD-10-CM

## 2021-08-05 DIAGNOSIS — E03.9 HYPOTHYROIDISM, UNSPECIFIED TYPE: ICD-10-CM

## 2021-08-05 PROCEDURE — 99204 OFFICE O/P NEW MOD 45 MIN: CPT | Performed by: NURSE PRACTITIONER

## 2021-08-05 RX ORDER — CONJUGATED ESTROGENS 0.62 MG/G
CREAM VAGINAL
COMMUNITY
End: 2022-02-21

## 2021-08-05 RX ORDER — LORAZEPAM 1 MG/1
TABLET ORAL
COMMUNITY
Start: 2021-05-17

## 2021-08-05 SDOH — ECONOMIC STABILITY: FOOD INSECURITY: WITHIN THE PAST 12 MONTHS, THE FOOD YOU BOUGHT JUST DIDN'T LAST AND YOU DIDN'T HAVE MONEY TO GET MORE.: NEVER TRUE

## 2021-08-05 SDOH — ECONOMIC STABILITY: FOOD INSECURITY: WITHIN THE PAST 12 MONTHS, YOU WORRIED THAT YOUR FOOD WOULD RUN OUT BEFORE YOU GOT MONEY TO BUY MORE.: NEVER TRUE

## 2021-08-05 ASSESSMENT — SOCIAL DETERMINANTS OF HEALTH (SDOH): HOW HARD IS IT FOR YOU TO PAY FOR THE VERY BASICS LIKE FOOD, HOUSING, MEDICAL CARE, AND HEATING?: NOT HARD AT ALL

## 2021-08-05 ASSESSMENT — PATIENT HEALTH QUESTIONNAIRE - PHQ9
SUM OF ALL RESPONSES TO PHQ9 QUESTIONS 1 & 2: 0
2. FEELING DOWN, DEPRESSED OR HOPELESS: 0
1. LITTLE INTEREST OR PLEASURE IN DOING THINGS: 0
SUM OF ALL RESPONSES TO PHQ QUESTIONS 1-9: 0

## 2021-08-05 NOTE — PATIENT INSTRUCTIONS
Patient Education        Body Mass Index: Care Instructions  Your Care Instructions     Body mass index (BMI) can help you see if your weight is raising your risk for health problems. It uses a formula to compare how much you weigh with how tall you are. · A BMI lower than 18.5 is considered underweight. · A BMI between 18.5 and 24.9 is considered healthy. · A BMI between 25 and 29.9 is considered overweight. A BMI of 30 or higher is considered obese. If your BMI is in the normal range, it means that you have a lower risk for weight-related health problems. If your BMI is in the overweight or obese range, you may be at increased risk for weight-related health problems, such as high blood pressure, heart disease, stroke, arthritis or joint pain, and diabetes. If your BMI is in the underweight range, you may be at increased risk for health problems such as fatigue, lower protection (immunity) against illness, muscle loss, bone loss, hair loss, and hormone problems. BMI is just one measure of your risk for weight-related health problems. You may be at higher risk for health problems if you are not active, you eat an unhealthy diet, or you drink too much alcohol or use tobacco products. Follow-up care is a key part of your treatment and safety. Be sure to make and go to all appointments, and call your doctor if you are having problems. It's also a good idea to know your test results and keep a list of the medicines you take. How can you care for yourself at home? · Practice healthy eating habits. This includes eating plenty of fruits, vegetables, whole grains, lean protein, and low-fat dairy. · If your doctor recommends it, get more exercise. Walking is a good choice. Bit by bit, increase the amount you walk every day. Try for at least 30 minutes on most days of the week. · Do not smoke. Smoking can increase your risk for health problems.  If you need help quitting, talk to your doctor about stop-smoking programs and medicines. These can increase your chances of quitting for good. · Limit alcohol to 2 drinks a day for men and 1 drink a day for women. Too much alcohol can cause health problems. If you have a BMI higher than 25  · Your doctor may do other tests to check your risk for weight-related health problems. This may include measuring the distance around your waist. A waist measurement of more than 40 inches in men or 35 inches in women can increase the risk of weight-related health problems. · Talk with your doctor about steps you can take to stay healthy or improve your health. You may need to make lifestyle changes to lose weight and stay healthy, such as changing your diet and getting regular exercise. If you have a BMI lower than 18.5  · Your doctor may do other tests to check your risk for health problems. · Talk with your doctor about steps you can take to stay healthy or improve your health. You may need to make lifestyle changes to gain or maintain weight and stay healthy, such as getting more healthy foods in your diet and doing exercises to build muscle. Where can you learn more? Go to https://Smarty Antsterrance.Jintronix. org and sign in to your Zoomy account. Enter S176 in the [employer] box to learn more about \"Body Mass Index: Care Instructions. \"     If you do not have an account, please click on the \"Sign Up Now\" link. Current as of: March 17, 2021               Content Version: 12.9  © 5324-9508 Healthwise, Incorporated. Care instructions adapted under license by [facility] ([facility]). If you have questions about a medical condition or this instruction, always ask your healthcare professional. [address] any warranty or liability for your use of this information. Patient Education        Learning About Low-Carbohydrate Diets  What is a low-carbohydrate diet?      A low-carbohydrate (or \"low-carb\") diet limits foods and drinks that have carbohydrates. This includes grains, fruits, milk and yogurt, and starchy vegetables like potatoes, beans, and corn. It also avoids foods and drinks that have added sugar. Instead, low-carb diets include foods that are high in protein and fat. Why might you follow a low-carb diet? Low-carb diets may be used for a variety of reasons, such as for weight loss. People who have diabetes may use a low-carb diet to help manage their blood sugar levels. What should you do before you start the diet? Talk to your doctor before you try any diet. This is even more important if you have health problems like kidney disease, heart disease, or diabetes. Your doctor may suggest that you meet with a registered dietitian. A dietitian can help you make an eating plan that works for you. What foods do you eat on a low-carb diet? On a low-carb diet, you choose foods that are high in protein and fat. Examples of these are:  · Meat, poultry, and fish. · Eggs. · Nuts, such as walnuts, pecans, almonds, and peanuts. · Peanut butter and other nut butters. · Tofu. · Avocado. · Lossie Adam. · Non-starchy vegetables like broccoli, cauliflower, green beans, mushrooms, peppers, lettuce, and spinach. · Unsweetened non-dairy milks like almond milk and coconut milk. · Cheese, cottage cheese, and cream cheese. Current as of: December 17, 2020               Content Version: 12.9  © 2006-2021 Healthwise, LOVEThESIGN. Care instructions adapted under license by [facility] ([facility]). If you have questions about a medical condition or this instruction, always ask your healthcare professional. [place] [address] any warranty or liability for your use of this information. Patient Education        Learning About Low-Fat Eating  What is low-fat eating? Most food has some fat in it. Your body needs some fat to be healthy. But some kinds of fats are healthier than others.   In a low-fat eating plan, you try to choose healthier fats and eat fewer unhealthy fats. Healthy fats include olive and canola oil. Try to avoid eating too much saturated fat, such as in cheese and meats. You do not need to cut all fat from your diet. But you can make healthier choices about the types and amount of fat you eat. Even though it is a good idea to choose healthier fats, it is still important to be careful of how much fat you eat, because all fats are high in calories. What are the different types of fats? Unhealthy fat  · Saturated fat. Saturated fats are mostly in animal foods, such as meat and dairy foods. Tropical oils, such as coconut oil, palm oil, and cocoa butter, are also saturated fats. Healthy fats  · Monounsaturated fat. Monounsaturated fats are liquid at room temperature but get solid when refrigerated. Eating foods that are high in this fat may help lower your \"bad\" (LDL) cholesterol, keep your \"good\" (HDL) cholesterol level up, and lower your chances of getting coronary artery disease. This fat is found in canola oil, olive oil, peanut oil, olives, avocados, nuts, and nut butters. · Polyunsaturated fat. Polyunsaturated fats are liquid at room temperature. They are in safflower, sunflower, and corn oils. They are also the main fat in seafood. Omega-3 fatty acids are types of polyunsaturated fat. Eating fish may lower your chances of getting coronary artery disease. Fatty fish such as salmon and mackerel contain these healthy fatty acids. So do ground flaxseeds and flaxseed oil, soybeans, walnuts, and seeds. Why cut down on unhealthy fats? Eating foods that contain saturated fats can raise the LDL (\"bad\") cholesterol in your blood. Having a high level of LDL cholesterol increases your chance of hardening of the arteries (atherosclerosis), which can lead to heart disease, heart attack, and stroke. In general:  · No more than 10% of your daily calories should come from saturated fat. This is about 20 grams in a 2,000-calorie diet.   · No more than 10% of your daily calories should come from polyunsaturated fat. This is about 20 grams in a 2,000-calorie diet. · Monounsaturated fats can be up to 15% of your daily calories. This is about 25 to 30 grams in a 2,000-calorie diet. If you're not sure how much fat you should be eating or how many calories you need each day to stay at a healthy weight, talk to a registered dietitian. A dietitian can help you create a plan that's right for you. What can you do to cut down on fat? Foods like cheese, butter, sausage, and desserts can have a lot of unhealthy fats. Try these tips for healthier meals at home and when you eat out. At home  · Fill up on fruits, vegetables, and whole grains. · Think of meat as a side dish instead of as the main part of your meal.  · When you do eat meat, make it extra-lean ground beef (97% lean), ground turkey breast (without skin added), meats with fat trimmed off before cooking, or skinless chicken. · Try main dishes that use whole wheat pasta, brown rice, dried beans, or vegetables. · Use cooking methods that use little or no fat, such as broiling, steaming, or grilling. Use cooking spray instead of oil. If you use oil, use a monounsaturated oil, such as canola or olive oil. · Read food labels on canned, bottled, or packaged foods. Choose those with little saturated fat. When eating out at a restaurant  · Order foods that are broiled or poached instead of fried or breaded. · Cut back on the amount of butter or margarine that you use on bread. Use small amounts of olive oil instead. · Order sauces, gravies, and salad dressings on the side, and use only a little. · When you order pasta, choose tomato sauce instead of cream sauce. · Ask for salsa with your baked potato instead of sour cream, butter, cheese, or gutierrez. Where can you learn more? Go to https://sobia.healthClothes Horse. org and sign in to your [employer] Crossing account.  Enter C699 in the [facility] box to learn more about \"Learning About Low-Fat Eating. \"     If you do not have an account, please click on the \"Sign Up Now\" link. Current as of: December 17, 2020               Content Version: 12.9  © 2006-2021 Healthwise, FusionStorm. Care instructions adapted under license by [facility] ([facility]). If you have questions about a medical condition or this instruction, always ask your healthcare professional. [address] any warranty or liability for your use of this information. Patient Education        Learning About Healthy Weight  What is a healthy weight? A healthy weight is the weight at which you feel good about yourself and have energy for work and play. It's also one that lowers your risk for health problems. What can you do to stay at a healthy weight? It can be hard to stay at a healthy weight, especially when fast food, vending-machine snacks, and processed foods are so easy to find. And with your busy lifestyle, activity may be low on your list of things to do. But staying at a healthy weight may be easier than you think. Here are some dos and don'ts for staying at a healthy weight. Do eat healthy foods  The kinds of foods you eat have a big impact on both your weight and your health. Reaching and staying at a healthy weight is not about going on a diet. It's about making healthier food choices every day and changing your diet for good. Healthy eating means eating a variety of foods so that you get all the nutrients you need. Your body needs protein, carbohydrate, and fats for energy. They keep your heart beating, your brain active, and your muscles working. On most days, try to eat from each food group. This means eating a variety of:  · Whole grains, such as whole wheat breads and pastas. · Fruits and vegetables. · Dairy products, such as low-fat milk, yogurt, and cheese. · Lean proteins, such as all types of fish, chicken without the skin, and beans.   Don't have too much or too little of one thing. All foods, if eaten in moderation, can be part of healthy eating. Even sweets can be okay. If your favorite foods are high in fat, salt, sugar, or calories, limit how often you eat them. Eat smaller servings, or look for healthy substitutes. Do watch what you eat  Many people eat more than their bodies need. Part of staying at a healthy weight means learning how much food you really need from day to day and not eating more than that. Even with healthy foods, eating too much can make you gain weight. Having a well-balanced diet means that you eat enough, but not too much, and that your food gives you the nutrients you need to stay healthy. So listen to your body. Eat when you're hungry. Stop when you feel satisfied. It's a good idea to have healthy snacks ready for when you get hungry. Keep healthy snacks with you at work, in your car, and at home. If you have a healthy snack easily available, you'll be less likely to pick a candy bar or bag of chips from a vending machine instead. Some healthy snacks you might want to keep on hand are fruit, low-fat yogurt, string cheese, low-fat microwave popcorn, raisins and other dried fruit, nuts, whole wheat crackers, pretzels, carrots, celery sticks, and broccoli. Do some physical activity  A big part of reaching and staying at a healthy weight is being active. When you're active, you burn calories. This makes it easier to reach and stay at a healthy weight. When you're active on a regular basis, your body burns more calories, even when you're at rest. Being active helps you lose fat and build lean muscle. Try to be active for at least 1 hour every day. This may sound like a lot, but it's okay to be active in smaller blocks of time that add up to 1 hour a day. Any activity that makes your heart beat faster and keeps it there for a while counts. A brisk walk, run, or swim will get your heart beating faster.  So will climbing stairs, shooting baskets, or cycling. Even some household chores like vacuuming and mowing the lawn will get your heart rate up. Pick activities that you enjoy--ones that make your heart beat faster, your muscles stronger, and your muscles and joints more flexible. If you find more than one thing you like doing, do them all. You don't have to do the same thing every day. Don't diet  Diets don't work. Diets are temporary. Because you give up so much when you diet, you may be hungry and think about food all the time. And after you stop dieting, you also may overeat to make up for what you missed. Most people who diet end up gaining back the pounds they lost--and more. Remember that healthy bodies come in lots of shapes and sizes. Everyone can get healthier by eating better and being more active. Where can you learn more? Go to https://Redox Power SystemspeSirionLabs.Iono Pharma. org and sign in to your SayHello [employer] account. Enter 419 [id number] in the [employer] box to learn more about \"Learning About Healthy Weight. \"     If you do not have an account, please click on the \"Sign Up Now\" link. Current as of: March 17, 2021               Content Version: 12.9  © 2006-2021 Healthwise, MailPix. Care instructions adapted under license by [facility] ([facility]). If you have questions about a medical condition or this instruction, always ask your healthcare professional. [place] [address] any warranty or liability for your use of this information. Patient Education        When You Are Overweight: Care Instructions  Your Care Instructions     If you're overweight, your doctor may recommend that you make changes in your eating and exercise habits. Being overweight can lead to serious health problems, such as high blood pressure, heart disease, type 2 diabetes, and arthritis, or it can make these problems worse. Eating a healthy diet and being more active can help you reach and stay at a healthy weight.   You don't have to make huge changes all at once. Start by making small changes in your eating and exercise habits. To lose weight, you need to burn more calories than you take in. You can do this by eating healthy foods in reasonable amounts and becoming more active every day. Follow-up care is a key part of your treatment and safety. Be sure to make and go to all appointments, and call your doctor if you are having problems. It's also a good idea to know your test results and keep a list of the medicines you take. How can you care for yourself at home? · Improve your eating habits. You'll be more successful if you work on changing one eating habit at a time. All foods, if eaten in moderation, can be part of healthy eating. Remember to:  ? Eat a variety of foods from each food group. Include grains, vegetables, fruits, dairy, and protein foods. ? Limit foods high in fat, sugar, and calories. ? Eat slowly. And don't do anything else, such as watch TV, while you are eating. ? Pay attention to portion sizes. Put your food on a smaller plate. ? Plan your meals ahead of time. You'll be less likely to grab something that's not as healthy. · Get active. Regular activity can help you feel better, have more energy, and burn more calories. If you haven't been active, start slowly. Start with at least 30 minutes of moderate activity on most days of the week. Then gradually increase the amount of activity. Try for 60 or 90 minutes a day, at least 5 days a week. There are a lot of ways to fit activity into your life. You can:  ? Walk or bike to the store. Or walk with a friend, or walk the dog.  ? Mow the lawn, rake leaves, shovel snow, or do some gardening. ? Use the stairs instead of the elevator, at least for a few floors. · Change your thinking. Your thoughts have a lot to do with how you feel and what you do. When you're trying to reach a healthy weight, changing how you think about certain things may help.  Here are some ideas:  ? Don't compare yourself to others. Healthy bodies come in all shapes and sizes. ? Pay attention to how hungry or full you feel. When you eat, be aware of why you're eating and how much you're eating. ? Focus on improving your health instead of dieting. Dieting almost never works over the long term. · Ask your doctor about other health professionals who can help you reach a healthy weight. ? A dietitian can help you make healthy changes in your diet. ? An exercise specialist or  can help you develop a safe and effective exercise program.  ? A counselor or psychiatrist can help you cope with issues such as depression, anxiety, or family problems that can make it hard to focus on reaching a healthy weight. · Get support from your family, your doctor, your friends, a support group--and support yourself. Where can you learn more? Go to https://MattersightpeFabkidseb.Voxeo. org and sign in to your Cue account. Enter J614 in the Ecinity box to learn more about \"When You Are Overweight: Care Instructions. \"     If you do not have an account, please click on the \"Sign Up Now\" link. Current as of: March 17, 2021               Content Version: 12.9  © 2006-2021 Healthwise, Incorporated. Care instructions adapted under license by [facility] ([facility]). If you have questions about a medical condition or this instruction, always ask your healthcare professional. [place] [address] any warranty or liability for your use of this information.

## 2021-08-05 NOTE — PROGRESS NOTES
[address] [place]  [address] [place], [address]  Ph:   [phone]  Fax: [phone]    Provider:  KALYANI Montes CNP    Patient:  Adarsh Awan  YOB: 1960      Visit Date:  8/5/2021     Reason For Visit:   Chief Complaint   Patient presents with   Tomas Mata New Patient        Yamel Hanson is a 62 y.o. female who comes today to the office to [place] [id number]. She states she has been doing well, taking her medications as prescribed. She denies any side effects from her medications. Concerned about her weight. 130 lbs when she graduated high school. Would like to be 140 lbs. She is looking into a commercial product \"Calibrate\", but wonders if I can help her look for a reason she is having trouble losing weight. According to the chart, she has been 155 lbs for the last 2-2.5 years. She is asking about seeing an endocrinologist.     HTN: takes Hyzaar 50-12.5 mg 1 tablet daily, for about 30 years. States it ws started because she used to get migraines, and her BP would go up. She no longer has issues with migraines. She does not check her BP at home. She cooks with sat, but does not add more at the table. Typically eats a lot of fruit, salads. Grilled chicken. Does not eat bread or potatoes. She sticks to 1500 calories per day. She walks her dog and rides hike most days of the week. She averages 15,000- 20,000 steps per day. No FH of early ischemic heart disease. Former smoker, quit 30 years ago. Hypothyroidism: Medication for many years. Unsure reason. Denies goiter or radiation. Takes Levothyroxine 50 mcg 1 tablet daily, but itself on an empty stomach. Last level was 1.74 in Feb 2019. Menopause about 10 years ago. Follws with a GYN. Her daughter has PCOS and is insulin resisitant. Yamel Hanson reports she did have heavy periods when she was younger. Also had trouble conceiving until she had a procedure to \"clean everything out\".  Also reports some hair on her chin, worsening as she gets older. She endorses pain and swelling in both hands. Inflammatory work up by Dr Houston Ledesma in 2019 was negative. She does not take any OTC medication. She has Ativan 1 mg that she takes only when she flies (all 3 children are out of state). She did receive her COVID vaccine. Significant Past Medical History:    Past Medical History:   Diagnosis Date    Carcinoma, basal cell, skin     Hypertension            Allergies:  is allergic to pcn [penicillins]. Medications:   Current Outpatient Medications   Medication Sig Dispense Refill    MULTIPLE VITAMIN PO Take by mouth      Ascorbic Acid (VITAMIN C PO) Take by mouth daily      NONFORMULARY Z quil      LEVOTHYROXINE SODIUM PO Take 0.05 mg by mouth daily      losartan-hydrochlorothiazide (HYZAAR) 50-12.5 MG per tablet Take 1 tablet by mouth daily      conjugated estrogens (PREMARIN) 0.625 MG/GM vaginal cream Place vaginally      LORazepam (ATIVAN) 1 MG tablet TAKE 1 TABLET (1 MG) BY ORAL ROUTE 30 MINUTES PRIOR TO FLYING       No current facility-administered medications for this visit.        Review of systems:  Review of Systems - History obtained from the patient  General ROS: negative for - chills, fatigue or fever  Psychological ROS: negative for - anxiety, depression or sleep disturbances  ENT ROS: negative for - headaches, nasal congestion or nasal discharge  Allergy and Immunology ROS: negative for - seasonal allergies  Hematological and Lymphatic ROS: negative for - bruising  Endocrine ROS: difficulty losing weight   Respiratory ROS: negative for - cough,  shortness of breath or wheezing  Cardiovascular ROS: negative for - chest pain or edema  Gastrointestinal ROS: negative for - abdominal pain, constipation, diarrhea or nausea/vomiting  Musculoskeletal ROS: bilateral hand pain, joint swelling  Neurological ROS: negative for - dizziness  Dermatological ROS: negative for - rash    Physical Examination:  /86 Pulse 72   Ht 5' 3\" (1.6 m)   Wt 155 lb (70.3 kg)   SpO2 98%   BMI 27.46 kg/m²     General-  Alert and oriented x 3, NAD  HEENT: NC, AT, PERRLA, EOMI, anicteric sclerae  Ears: Normal tympanic membranes bilaterally  Nose: patent, no lesions  Mouth: no lesions, moist mucosas  Neck - supple, no significant adenopathy  Chest - clear to auscultation, no wheezes, rales or rhonchi, symmetric air entry  Heart - normal rate, regular rhythm, normal S1, S2, no murmurs, rubs, clicks or gallops  Abdomen - soft, nontender, nondistended, no masses or organomegaly  Extremities - peripheral pulses normal, no pedal edema, no clubbing or cyanosis    Impression:   Diagnosis Orders   1. Hypertension, unspecified type  Thyroid Peroxidase Antibody    CBC Auto Differential    Comprehensive Metabolic Panel    Urinalysis with Microscopic    Insulin, Fasting   2. Hypothyroidism, unspecified type  Thyroid Peroxidase Antibody    CBC Auto Differential    Comprehensive Metabolic Panel    Urinalysis with Microscopic    Insulin, Fasting    TSH With Reflex Ft4   3. BMI 27.0-27.9,adult  Thyroid Peroxidase Antibody    CBC Auto Differential    Comprehensive Metabolic Panel    Urinalysis with Microscopic    Insulin, Fasting   4. Screening cholesterol level  Lipid, Fasting   5. Advised about management of weight         Plan:  Orders Placed This Encounter   Procedures    Thyroid Peroxidase Antibody     Standing Status:   Future     Standing Expiration Date:   8/5/2022    CBC Auto Differential     Standing Status:   Future     Standing Expiration Date:   8/5/2022    Comprehensive Metabolic Panel     Standing Status:   Future     Standing Expiration Date:   8/5/2022    Urinalysis with Microscopic     Standing Status:   Future     Standing Expiration Date:   8/5/2022     Order Specific Question:   SPECIFY(EX-CATH,MIDSTREAM,CYSTO,ETC)?      Answer:   midstream    Lipid, Fasting     Standing Status:   Future     Standing Expiration Date:   8/5/2022  Insulin, Fasting     Standing Status:   Future     Standing Expiration Date:   8/5/2022    TSH With Reflex Ft4     Standing Status:   Future     Standing Expiration Date:   8/5/2022     No orders of the defined types were placed in this encounter. Will check routine labs, and include fasting insulin level. She reports hx of symptoms that may be consistent with PCOS, so perhaps there is a factor of insulin resistance. Based on lab work will determine if endocrinology is appropriate. Will check thyroid antibodies to determine the pathology behind her hypothyroidism and need for medication. Discussed decreasing fruit due to high sugar content. She otherwise lives a very healthy lifestyle regarding diet and activity. Also discussed use of Naproxen OTC for osteoarthritic pain. .     At least 45 minutes was spent with Jaswant Hardy, discussing current conditions, symptoms, current treatments and plan of care moving forward. Questions were answered to her satisfaction. Follow Up:  Return in about 3 months (around 11/5/2021) for weight.     Betty Timmons, APRN - CNP

## 2021-08-06 ENCOUNTER — NURSE ONLY (OUTPATIENT)
Dept: LAB | Age: 61
End: 2021-08-06

## 2021-08-06 DIAGNOSIS — E03.9 HYPOTHYROIDISM, UNSPECIFIED TYPE: ICD-10-CM

## 2021-08-06 DIAGNOSIS — Z13.220 SCREENING CHOLESTEROL LEVEL: ICD-10-CM

## 2021-08-06 DIAGNOSIS — I10 HYPERTENSION, UNSPECIFIED TYPE: ICD-10-CM

## 2021-08-06 LAB
ALBUMIN SERPL-MCNC: 4.4 G/DL (ref 3.5–5.1)
ALP BLD-CCNC: 75 U/L (ref 38–126)
ALT SERPL-CCNC: 23 U/L (ref 11–66)
ANION GAP SERPL CALCULATED.3IONS-SCNC: 8 MEQ/L (ref 8–16)
AST SERPL-CCNC: 27 U/L (ref 5–40)
BACTERIA: ABNORMAL
BASOPHILS # BLD: 0.7 %
BASOPHILS ABSOLUTE: 0 THOU/MM3 (ref 0–0.1)
BILIRUB SERPL-MCNC: 0.2 MG/DL (ref 0.3–1.2)
BILIRUBIN URINE: NEGATIVE
BLOOD, URINE: NEGATIVE
BUN BLDV-MCNC: 11 MG/DL (ref 7–22)
CALCIUM SERPL-MCNC: 9.5 MG/DL (ref 8.5–10.5)
CASTS: ABNORMAL /LPF
CASTS: ABNORMAL /LPF
CHARACTER, URINE: CLEAR
CHLORIDE BLD-SCNC: 101 MEQ/L (ref 98–111)
CHOLESTEROL, FASTING: 203 MG/DL (ref 100–199)
CO2: 27 MEQ/L (ref 23–33)
COLOR: YELLOW
CREAT SERPL-MCNC: 0.9 MG/DL (ref 0.4–1.2)
CRYSTALS: ABNORMAL
EOSINOPHIL # BLD: 1.2 %
EOSINOPHILS ABSOLUTE: 0 THOU/MM3 (ref 0–0.4)
EPITHELIAL CELLS, UA: ABNORMAL /HPF
ERYTHROCYTE [DISTWIDTH] IN BLOOD BY AUTOMATED COUNT: 11.9 % (ref 11.5–14.5)
ERYTHROCYTE [DISTWIDTH] IN BLOOD BY AUTOMATED COUNT: 42.6 FL (ref 35–45)
GFR SERPL CREATININE-BSD FRML MDRD: 64 ML/MIN/1.73M2
GLUCOSE BLD-MCNC: 90 MG/DL (ref 70–108)
GLUCOSE, URINE: NEGATIVE MG/DL
HCT VFR BLD CALC: 39.6 % (ref 37–47)
HDLC SERPL-MCNC: 62 MG/DL
HEMOGLOBIN: 13.1 GM/DL (ref 12–16)
IMMATURE GRANS (ABS): 0 THOU/MM3 (ref 0–0.07)
IMMATURE GRANULOCYTES: 0 %
KETONES, URINE: NEGATIVE
LDL CHOLESTEROL CALCULATED: 118 MG/DL
LEUKOCYTE ESTERASE, URINE: ABNORMAL
LYMPHOCYTES # BLD: 39.6 %
LYMPHOCYTES ABSOLUTE: 1.6 THOU/MM3 (ref 1–4.8)
MCH RBC QN AUTO: 32 PG (ref 26–33)
MCHC RBC AUTO-ENTMCNC: 33.1 GM/DL (ref 32.2–35.5)
MCV RBC AUTO: 96.8 FL (ref 81–99)
MISCELLANEOUS LAB TEST RESULT: ABNORMAL
MONOCYTES # BLD: 12.2 %
MONOCYTES ABSOLUTE: 0.5 THOU/MM3 (ref 0.4–1.3)
NITRITE, URINE: NEGATIVE
NUCLEATED RED BLOOD CELLS: 0 /100 WBC
PH UA: 8 (ref 5–9)
PLATELET # BLD: 178 THOU/MM3 (ref 130–400)
PMV BLD AUTO: 10.3 FL (ref 9.4–12.4)
POTASSIUM SERPL-SCNC: 3.9 MEQ/L (ref 3.5–5.2)
PROTEIN UA: NEGATIVE MG/DL
RBC # BLD: 4.09 MILL/MM3 (ref 4.2–5.4)
RBC URINE: ABNORMAL /HPF
RENAL EPITHELIAL, UA: ABNORMAL
SEG NEUTROPHILS: 46.3 %
SEGMENTED NEUTROPHILS ABSOLUTE COUNT: 1.9 THOU/MM3 (ref 1.8–7.7)
SODIUM BLD-SCNC: 136 MEQ/L (ref 135–145)
SPECIFIC GRAVITY UA: 1.01 (ref 1–1.03)
TOTAL PROTEIN: 6.7 G/DL (ref 6.1–8)
TRIGLYCERIDE, FASTING: 113 MG/DL (ref 0–199)
TSH SERPL DL<=0.05 MIU/L-ACNC: 1.18 UIU/ML (ref 0.4–4.2)
UROBILINOGEN, URINE: 0.2 EU/DL (ref 0–1)
WBC # BLD: 4.1 THOU/MM3 (ref 4.8–10.8)
WBC UA: ABNORMAL /HPF
YEAST: ABNORMAL

## 2021-08-07 LAB — INSULIN, RANDOM OR FASTING: 4.1 MU/L

## 2021-08-09 LAB — THYROID PEROXIDASE ANTIBODY: < 4 IU/ML (ref 0–25)

## 2021-08-12 ENCOUNTER — TELEPHONE (OUTPATIENT)
Dept: FAMILY MEDICINE CLINIC | Age: 61
End: 2021-08-12

## 2021-08-12 DIAGNOSIS — R30.0 DYSURIA: Primary | ICD-10-CM

## 2021-08-12 RX ORDER — NITROFURANTOIN 25; 75 MG/1; MG/1
100 CAPSULE ORAL 2 TIMES DAILY
Qty: 20 CAPSULE | Refills: 0 | Status: SHIPPED | OUTPATIENT
Start: 2021-08-12 | End: 2021-08-17

## 2021-09-02 ENCOUNTER — OFFICE VISIT (OUTPATIENT)
Dept: FAMILY MEDICINE CLINIC | Age: 61
End: 2021-09-02
Payer: COMMERCIAL

## 2021-09-02 VITALS
DIASTOLIC BLOOD PRESSURE: 70 MMHG | HEART RATE: 70 BPM | SYSTOLIC BLOOD PRESSURE: 119 MMHG | BODY MASS INDEX: 27.04 KG/M2 | HEIGHT: 64 IN | WEIGHT: 158.4 LBS

## 2021-09-02 DIAGNOSIS — J01.90 ACUTE SINUSITIS WITH SYMPTOMS > 10 DAYS: Primary | ICD-10-CM

## 2021-09-02 PROCEDURE — 99213 OFFICE O/P EST LOW 20 MIN: CPT | Performed by: NURSE PRACTITIONER

## 2021-09-02 RX ORDER — AZITHROMYCIN 250 MG/1
250 TABLET, FILM COATED ORAL SEE ADMIN INSTRUCTIONS
Qty: 6 TABLET | Refills: 0 | Status: SHIPPED | OUTPATIENT
Start: 2021-09-02 | End: 2021-09-07

## 2021-09-02 RX ORDER — LEVOTHYROXINE SODIUM 0.05 MG/1
TABLET ORAL
COMMUNITY
Start: 2021-08-20 | End: 2022-02-21 | Stop reason: SDUPTHER

## 2021-09-02 NOTE — PROGRESS NOTES
[address] [place]  [address] [place], [address]  Ph:   [phone]  Fax: [phone]    Provider:  KALYANI Fernández CNP    Patient:  Ayah Hall  YOB: 1960      Visit Date:  9/2/2021     Reason For Visit:   Chief Complaint   Patient presents with    Sinus Problem     Started 3 weeks ago; Covid Postive August 14; worse at night         Marisol Reid is a 62 y.o. female who comes today to the office for follow up. She states she has been doing well, taking her medications as prescribed. She denies any side effects from her medications. Had COVID with mild symptoms mid Aug. Ever since then, she still is having some sinus symptoms, particularly frontal pressure . Sudafed helps briefly, she has used advil, Liu's. No nasal sprays. No fevers, no cough. No PND or excessive rhinorrhea. No N/V/D. Significant Past Medical History:    Past Medical History:   Diagnosis Date    Carcinoma, basal cell, skin     Hypertension            Allergies:  is allergic to pcn [penicillins]. Medications:   Current Outpatient Medications   Medication Sig Dispense Refill    levothyroxine (SYNTHROID) 50 MCG tablet TAKE 1 TABLET BY MOUTH EVERY DAY      azithromycin (ZITHROMAX) 250 MG tablet Take 1 tablet by mouth See Admin Instructions for 5 days 500mg on day 1 followed by 250mg on days 2 - 5 6 tablet 0    conjugated estrogens (PREMARIN) 0.625 MG/GM vaginal cream Place vaginally      MULTIPLE VITAMIN PO Take by mouth      Ascorbic Acid (VITAMIN C PO) Take by mouth daily      NONFORMULARY Z quil      losartan-hydrochlorothiazide (HYZAAR) 50-12.5 MG per tablet Take 1 tablet by mouth daily      LORazepam (ATIVAN) 1 MG tablet TAKE 1 TABLET (1 MG) BY ORAL ROUTE 30 MINUTES PRIOR TO FLYING (Patient not taking: Reported on 9/2/2021)       No current facility-administered medications for this visit.        Review of systems:  Review of Systems - History obtained from the patient  General ROS: negative for - chills, fatigue or fever  ENT ROS: sinus pressure, facial pain  Allergy and Immunology ROS: negative for - seasonal allergies  Respiratory ROS: negative for - cough,  shortness of breath or wheezing  Cardiovascular ROS: negative for - chest pain or edema  Gastrointestinal ROS: negative for - abdominal pain, constipation, diarrhea or nausea/vomiting  Musculoskeletal ROS: negative for - joint pain or muscle pain    Physical Examination:  /70   Pulse 70   Ht 5' 4\" (1.626 m)   Wt 158 lb 6.4 oz (71.8 kg)   BMI 27.19 kg/m²     General-  Alert and oriented x 3, NAD  HEENT: NC, AT, PERRLA, EOMI, anicteric sclerae  Ears: Normal tympanic membranes bilaterally  Nose: patent, no lesions  Mouth: no lesions, moist mucosas  Neck - supple, no significant adenopathy  Chest - clear to auscultation, no wheezes, rales or rhonchi, symmetric air entry  Heart - normal rate, regular rhythm, normal S1, S2, no murmurs, rubs, clicks or gallops      Impression:   Diagnosis Orders   1. Acute sinusitis with symptoms > 10 days  azithromycin (ZITHROMAX) 250 MG tablet       Plan:  No orders of the defined types were placed in this encounter. Orders Placed This Encounter   Medications    azithromycin (ZITHROMAX) 250 MG tablet     Sig: Take 1 tablet by mouth See Admin Instructions for 5 days 500mg on day 1 followed by 250mg on days 2 - 5     Dispense:  6 tablet     Refill:  0      Zpak per package directions  Continue Sudafed per package directions  Flonase 1 spray each nostril daily as needed, watch for dryness, can cause nose bleeds. Lots of water. Follow Up:  Return if symptoms worsen or fail to improve.     Alphonso Maldonado, APRN - CNP

## 2021-09-02 NOTE — PATIENT INSTRUCTIONS
Patient Education      Gillermina Nipper per package directions  Continue Sudafed per package directions  Flonase 1 spray each nostril daily as needed, watch for dryness, can cause nose bleeds. Lots of water. Sinusitis: Care Instructions  Your Care Instructions     Sinusitis is an infection of the lining of the sinus cavities in your head. Sinusitis often follows a cold. It causes pain and pressure in your head and face. In most cases, sinusitis gets better on its own in 1 to 2 weeks. But some mild symptoms may last for several weeks. Sometimes antibiotics are needed. Follow-up care is a key part of your treatment and safety. Be sure to make and go to all appointments, and call your doctor if you are having problems. It's also a good idea to know your test results and keep a list of the medicines you take. How can you care for yourself at home? · Take an over-the-counter pain medicine, such as acetaminophen (Tylenol), ibuprofen (Advil, Motrin), or naproxen (Aleve). Read and follow all instructions on the label. · If the doctor prescribed antibiotics, take them as directed. Do not stop taking them just because you feel better. You need to take the full course of antibiotics. · Be careful when taking over-the-counter cold or flu medicines and Tylenol at the same time. Many of these medicines have acetaminophen, which is Tylenol. Read the labels to make sure that you are not taking more than the recommended dose. Too much acetaminophen (Tylenol) can be harmful. · Breathe warm, moist air from a steamy shower, a hot bath, or a sink filled with hot water. Avoid cold, dry air. Using a humidifier in your home may help. Follow the directions for cleaning the machine. · Use saline (saltwater) nasal washes. This can help keep your nasal passages open and wash out mucus and bacteria. You can buy saline nose drops at a grocery store or drugstore.  Or you can make your own at home by adding 1 teaspoon of salt and 1 teaspoon of baking soda to 2 cups of distilled water. If you make your own, fill a bulb syringe with the solution, insert the tip into your nostril, and squeeze gently. Romeo Posey your nose. · Put a hot, wet towel or a warm gel pack on your face 3 or 4 times a day for 5 to 10 minutes each time. · Try a decongestant nasal spray like oxymetazoline (Afrin). Do not use it for more than 3 days in a row. Using it for more than 3 days can make your congestion worse. When should you call for help? Call your doctor now or seek immediate medical care if:    · You have new or worse swelling or redness in your face or around your eyes.     · You have a new or higher fever. Watch closely for changes in your health, and be sure to contact your doctor if:    · You have new or worse facial pain.     · The mucus from your nose becomes thicker (like pus) or has new blood in it.     · You are not getting better as expected. Where can you learn more? Go to [email or web address] org and sign in to your [employer] account. Enter T405 in the [employer] box to learn more about \"Sinusitis: Care Instructions. \"     If you do not have an account, please click on the \"Sign Up Now\" link. Current as of: December 2, 2020               Content Version: 12.9  © 2006-2021 Healthwise, Incorporated. Care instructions adapted under license by [facility] ([facility]). If you have questions about a medical condition or this instruction, always ask your healthcare professional. [place] [address] any warranty or liability for your use of this information. Patient Education        Saline Nasal Washes: Care Instructions  Your Care Instructions     Saline nasal washes help keep the nasal passages open by washing out thick or dried mucus. This simple remedy can help relieve symptoms of allergies, sinusitis, and colds.  It also can make the nose feel more comfortable by keeping the mucous membranes moist. You may notice a little 12.9  © 2006-2021 Healthwise, Incorporated. Care instructions adapted under license by [facility] ([facility]). If you have questions about a medical condition or this instruction, always ask your healthcare professional. [address] any warranty or liability for your use of this information.

## 2021-11-30 ENCOUNTER — TELEPHONE (OUTPATIENT)
Dept: FAMILY MEDICINE CLINIC | Age: 61
End: 2021-11-30

## 2021-11-30 NOTE — TELEPHONE ENCOUNTER
----- Message from Mayur Lozano sent at 11/30/2021 10:44 AM EST -----  Subject: Message to Provider    QUESTIONS  Information for Provider? Patient wants to get a TDAP Vaccine, and wanted   to see if an order can be placed to get one at the health department this   week or next week, states message can be sent through My Chart in response   to this message. Wants to get it done this week or by the end of next   week.   ---------------------------------------------------------------------------  --------------  CALL BACK INFO  What is the best way for the office to contact you? OK to leave message on   voicemail  Preferred Call Back Phone Number? [phone]  ---------------------------------------------------------------------------  --------------  SCRIPT ANSWERS  Relationship to Patient?  Self

## 2021-12-02 ENCOUNTER — NURSE ONLY (OUTPATIENT)
Dept: FAMILY MEDICINE CLINIC | Age: 61
End: 2021-12-02

## 2021-12-02 DIAGNOSIS — Z23 NEED FOR VACCINATION FOR DTAP: Primary | ICD-10-CM

## 2021-12-02 NOTE — PROGRESS NOTES
Immunizations Administered     Name Date Dose Route    Tdap (Boostrix, Adacel) 12/2/2021 0.5 mL Intramuscular    Lot: [id number]    NDC: 00121-077-83      Patient tolerated well. No adverse reaction noted at this time.

## 2022-02-21 ENCOUNTER — OFFICE VISIT (OUTPATIENT)
Dept: FAMILY MEDICINE CLINIC | Age: 62
End: 2022-02-21
Payer: COMMERCIAL

## 2022-02-21 VITALS
HEART RATE: 86 BPM | BODY MASS INDEX: 26.98 KG/M2 | WEIGHT: 158 LBS | HEIGHT: 64 IN | SYSTOLIC BLOOD PRESSURE: 113 MMHG | DIASTOLIC BLOOD PRESSURE: 70 MMHG

## 2022-02-21 DIAGNOSIS — E03.9 HYPOTHYROIDISM, UNSPECIFIED TYPE: ICD-10-CM

## 2022-02-21 DIAGNOSIS — I10 HYPERTENSION, UNSPECIFIED TYPE: ICD-10-CM

## 2022-02-21 DIAGNOSIS — Z23 NEED FOR PROPHYLACTIC VACCINATION AND INOCULATION AGAINST VARICELLA: ICD-10-CM

## 2022-02-21 DIAGNOSIS — Z12.31 ENCOUNTER FOR SCREENING MAMMOGRAM FOR BREAST CANCER: ICD-10-CM

## 2022-02-21 DIAGNOSIS — Z00.00 ENCOUNTER FOR WELL ADULT EXAM WITHOUT ABNORMAL FINDINGS: Primary | ICD-10-CM

## 2022-02-21 DIAGNOSIS — Z71.89 ACP (ADVANCE CARE PLANNING): ICD-10-CM

## 2022-02-21 PROCEDURE — 99396 PREV VISIT EST AGE 40-64: CPT | Performed by: NURSE PRACTITIONER

## 2022-02-21 PROCEDURE — 90471 IMMUNIZATION ADMIN: CPT | Performed by: NURSE PRACTITIONER

## 2022-02-21 PROCEDURE — 90750 HZV VACC RECOMBINANT IM: CPT | Performed by: NURSE PRACTITIONER

## 2022-02-21 RX ORDER — LEVOTHYROXINE SODIUM 0.05 MG/1
TABLET ORAL
Qty: 90 TABLET | Refills: 1 | Status: SHIPPED | OUTPATIENT
Start: 2022-02-21 | End: 2022-08-22 | Stop reason: SDUPTHER

## 2022-02-21 RX ORDER — LOSARTAN POTASSIUM AND HYDROCHLOROTHIAZIDE 12.5; 5 MG/1; MG/1
1 TABLET ORAL DAILY
Qty: 90 TABLET | Refills: 1 | Status: SHIPPED | OUTPATIENT
Start: 2022-02-21 | End: 2022-10-20 | Stop reason: RX

## 2022-02-21 NOTE — PROGRESS NOTES
Well Adult Note  Name: Sabino Valentine Date: 2022   MRN: [id number] Sex: Female   Age: 62 y.o. Ethnicity: Non- / Non    : 1960 Race: White (non-)      Vadim Lomax is here for well adult exam.  History:    HTN: Hyzaar 50-12.5 mg 1 tablet daily. States it was started because she used to get migraines, and her BP would go up. She no longer has issues with migraines. She does not check her BP at home. Mostly low salt diet. Riding her bike for exercise. No cardiac symptoms. Hypothyroidism: Levothyroxine 50 mcg daily. TSH 1.18 in Aug. Antibodies negative. Low back pain with bilateral thigh pain. Has had 3 steroid shots with Dr Rolly Santos at [facility] in [place]. Surgery is planned for 3/8/22. No hx of blood clots, no hx blood transfusion Hx mild post op nausea/vomiting. Review of Systems   All other systems reviewed and are negative. Allergies   Allergen Reactions    Pcn [Penicillins]          Prior to Visit Medications    Medication Sig Taking? Authorizing Provider   losartan-hydroCHLOROthiazide (HYZAAR) 50-12.5 MG per tablet Take 1 tablet by mouth daily Yes KALYANI Whalen CNP   levothyroxine (SYNTHROID) 50 MCG tablet TAKE 1 TABLET BY MOUTH EVERY DAY Yes KALYANI Whalen CNP   LORazepam (ATIVAN) 1 MG tablet TAKE 1 TABLET (1 MG) BY ORAL ROUTE 30 MINUTES PRIOR TO FLYING Yes Historical Provider, MD   MULTIPLE VITAMIN PO Take by mouth Yes Historical Provider, MD   Ascorbic Acid (VITAMIN C PO) Take by mouth daily Yes Historical Provider, MD   NONFORMULARY Z quil Yes Historical Provider, MD         Past Medical History:   Diagnosis Date    Carcinoma, basal cell, skin     Hypertension        Past Surgical History:   Procedure Laterality Date    SKIN BIOPSY      basal cell        History reviewed. No pertinent family history.     Social History     Tobacco Use    Smoking status: Former Smoker     Packs/day: 1.50     Years: 10.00 Pack years: 15.00     Types: Cigarettes     Start date: 1/15/1979     Quit date: 1/15/1989     Years since quittin.1    Smokeless tobacco: Never Used   Substance Use Topics    Alcohol use: Not on file    Drug use: Not on file       Objective   /70   Pulse 86   Ht 5' 4\" (1.626 m)   Wt 158 lb (71.7 kg)   BMI 27.12 kg/m²   Wt Readings from Last 3 Encounters:   22 158 lb (71.7 kg)   21 158 lb 6.4 oz (71.8 kg)   21 155 lb (70.3 kg)     There were no vitals filed for this visit. Physical Exam  Vitals and nursing note reviewed. Constitutional:       General: She is not in acute distress. HENT:      Head: Normocephalic. Right Ear: External ear normal.      Left Ear: External ear normal.      Mouth/Throat:      Pharynx: No oropharyngeal exudate. Cardiovascular:      Rate and Rhythm: Normal rate and regular rhythm. Heart sounds: Normal heart sounds. No murmur heard. No friction rub. No gallop. Pulmonary:      Effort: Pulmonary effort is normal. No respiratory distress. Breath sounds: Normal breath sounds. No wheezing or rales. Abdominal:      General: Bowel sounds are normal. There is no distension. Palpations: Abdomen is soft. Tenderness: There is no abdominal tenderness. There is no rebound. Musculoskeletal:         General: Normal range of motion. Cervical back: Full passive range of motion without pain, normal range of motion and neck supple. Lumbar back: Negative right straight leg raise test and negative left straight leg raise test.   Lymphadenopathy:      Cervical: No cervical adenopathy. Skin:     General: Skin is warm and dry. Findings: No erythema or rash. Neurological:      Mental Status: She is alert and oriented to person, place, and time. Psychiatric:         Judgment: Judgment normal.           Assessment   Plan   1. Encounter for well adult exam without abnormal findings  2.  ACP (advance care planning)  - [facility] Referral to ACP Clinical Specialist  3. Hypertension, unspecified type  4. Hypothyroidism, unspecified type  5. Need for prophylactic vaccination and inoculation against varicella  -     Zoster Subunit ([address])  6. Encounter for screening mammogram for breast cancer  -     [facility] Digital Screen Bilateral [[id number]]; Future         Personalized Preventive Plan   Current Health Maintenance Status  Immunization History   Administered Date(s) Administered    COVID-19, Pfizer Purple top, DILUTE for use, 12+ yrs, 30mcg/0.3mL dose 03/17/2021, 04/14/2021, 11/15/2021    Tdap (Boostrix, Adacel) 12/02/2021    Zoster Recombinant (Shingrix) 02/21/2022        Health Maintenance   Topic Date Due    Hepatitis C screen  Never done    HIV screen  Never done    Cervical cancer screen  Never done    Colorectal Cancer Screen  Never done    Breast cancer screen  07/16/2021    Shingles Vaccine (2 of 2) 04/18/2022    Depression Screen  08/05/2022    Potassium monitoring  08/06/2022    Creatinine monitoring  08/06/2022    Lipid screen  08/06/2026    DTaP/Tdap/Td vaccine (2 - Td or Tdap) 12/02/2031    Flu vaccine  Completed    COVID-19 Vaccine  Completed    Hepatitis A vaccine  Aged Out    Hepatitis B vaccine  Aged Out    Hib vaccine  Aged Out    Meningococcal (ACWY) vaccine  Aged Out    Pneumococcal 0-64 years Vaccine  Aged Out     Recommendations for [employer] Due: see orders and patient instructions/AVS.    Return in 6 months (on 8/21/2022). Advance Care Planning   Advanced Care Planning: Discussed the patients choices for care and treatment in case of a health event that adversely affects decision-making abilities. Also discussed the patients long-term treatment options. Reviewed with the patient the appropriate state-specific advance directive documents.  Reviewed the process of designating a competent adult as an Agent (or -in-fact) that could take make health care decisions for the patient if incompetent. Patient was asked to complete the declaration forms, either acknowledge the forms by a public notary or an eligible witness and provide a signed copy to the practice office. Time spent (minutes): 3      Preop labs from ortho surgeon reviewed. Patient is acceptable risk to proceed with surgery. DVT prophylaxis per surgeon. Patient advised to stop all over-the-counter supplements, vitamins, and minerals at least 10 days prior to surgery.

## 2022-02-21 NOTE — PATIENT INSTRUCTIONS
Advance Directives: Care Instructions  Overview  An advance directive is a legal way to state your wishes at the end of your life. It tells your family and your doctor what to do if you can't say what you want. There are two main types of advance directives. You can change them any time your wishes change. Living will. This form tells your family and your doctor your wishes about life support and other treatment. The form is also called a declaration. Medical power of . This form lets you name a person to make treatment decisions for you when you can't speak for yourself. This person is called a health care agent (health care proxy, health care surrogate). The form is also called a durable power of  for health care. If you do not have an advance directive, decisions about your medical care may be made by a family member, or by a doctor or a  who doesn't know you. It may help to think of an advance directive as a gift to the people who care for you. If you have one, they won't have to make tough decisions by themselves. Follow-up care is a key part of your treatment and safety. Be sure to make and go to all appointments, and call your doctor if you are having problems. It's also a good idea to know your test results and keep a list of the medicines you take. What should you include in an advance directive? Many states have a unique advance directive form. (It may ask you to address specific issues.) Or you might use a universal form that's approved by many states. If your form doesn't tell you what to address, it may be hard to know what to include in your advance directive. Use the questions below to help you get started. · Who do you want to make decisions about your medical care if you are not able to? · What life-support measures do you want if you have a serious illness that gets worse over time or can't be cured? · What are you most afraid of that might happen?  (Maybe you're afraid of having pain, losing your independence, or being kept alive by machines.)  · Where would you prefer to die? (Your home? A hospital? A nursing home?)  · Do you want to donate your organs when you die? · Do you want certain Buddhism practices performed before you die? When should you call for help? Be sure to contact your doctor if you have any questions. Where can you learn more? Go to [email or web address] and sign in to your [email or web address] account. Enter R264 in the [employer] box to learn more about \"Advance Directives: Care Instructions. \"     If you do not have an account, please click on the \"Sign Up Now\" link. Current as of: March 17, 2021               Content Version: 13.1  © 7598-8983 [employer]. Care instructions adapted under license by [facility] ([facility]). If you have questions about a medical condition or this instruction, always ask your healthcare professional. [place] [address] any warranty or liability for your use of this information. Learning About Medical Power of   What is a medical power of ? A medical power of , also called a durable power of  for health care, is one type of the legal forms called advance directives. It lets you name the person you want to make treatment decisions for you if you can't speak or decide for yourself. The person you choose is called your health care agent. This person is also called a health care proxy or health care surrogate. A medical power of  may be called something else in your state. How do you choose a health care agent? Choose your health care agent carefully. This person may or may not be a family member. Talk to the person before you make your final decision. Make sure he or she is comfortable with this responsibility. It's a good idea to choose someone who:  · Is at least 25years old.   · Knows you well and understands what makes life meaningful for you. · Understands your Sabianist and moral values. · Will do what you want, not what he or she wants. · Will be able to make difficult choices at a stressful time. · Will be able to refuse or stop treatment, if that is what you would want, even if you could die. · Will be firm and confident with health professionals if needed. · Will ask questions to get needed information. · Lives near you or agrees to travel to you if needed. Your family may help you make medical decisions while you can still be part of that process. But it's important to choose one person to be your health care agent in case you aren't able to make decisions for yourself. If you don't fill out the legal form and name a health care agent, the decisions your family can make may be limited. A health care agent may be called something else in your state. Who will make decisions for you if you don't have a health care agent? If you don't have a health care agent or a living will, you may not get the care you want. Decisions may be made by family members who disagree about your medical care. Or decisions may be made by a medical professional who doesn't know you well. In some cases, a  makes the decisions. When you name a health care agent, it is very clear who has the power to make health decisions for you. How do you name a health care agent? You name your health care agent on a legal form. This form is usually called a medical power of . Ask your hospital, state bar association, or office on aging where to find these forms. You must sign the form to make it legal. Some states require you to get the form notarized. This means that a person called a  watches you sign the form and then he or she signs the form. Some states also require that two or more witnesses sign the form. Be sure to tell your family members and doctors who your health care agent is.   Where can you learn more?  Go to [email or web address]://[email or web address] org and sign in to your [employer] account. Enter [id number] in the [facility] box to learn more about \"Learning About [address]. \"     If you do not have an account, please click on the \"Sign Up Now\" link. Current as of: March 17, 2021               Content Version: 13.1  © 2535-9503 Healthwise, Join The Wellness Team. Care instructions adapted under license by [facility] ([facility]). If you have questions about a medical condition or this instruction, always ask your healthcare professional. [address] any warranty or liability for your use of this information. Learning About Living Perroy  What is a living will? A living will, also called a declaration, is a legal form. It tells your family and your doctor your wishes when you can't speak for yourself. It's used by the health professionals who will treat you as you near the end of your life or if you get seriously hurt or ill. If you put your wishes in writing, your loved ones and others will know what kind of care you want. They won't need to guess. This can ease your mind and be helpful to others. And you can change or cancel your living will at any time. A living will is not the same as an estate or property will. An estate will explains what you want to happen with your money and property after you die. How do you use it? A living will is used to describe the kinds of treatment or life support you want as you near the end of your life or if you get seriously hurt or ill. Keep these facts in mind about living lao. · Your living will is used only if you can't speak or make decisions for yourself. Most often, one or more doctors must certify that you can't speak or decide for yourself before your living will takes effect. · If you get better and can speak for yourself again, you can accept or refuse any treatment.  It doesn't matter what you said in your living will.  · Some states may limit your right to refuse treatment in certain cases. For example, you may need to clearly state in your living will that you don't want artificial hydration and nutrition, such as being fed through a tube. Is a living will a legal document? A living will is a legal document. Each state has its own laws about living lao. And a living will may be called something else in your state. Here are some things to know about living lao. · You don't need an  to complete a living will. But legal advice can be helpful if your state's laws are unclear. It can also help if your health history is complicated or your family can't agree on what should be in your living will. · You can change your living will at any time. Some people find that their wishes about end-of-life care change as their health changes. If you make big changes to your living will, complete a new form. · If you move to another state, make sure that your living will is legal in the state where you now live. In most cases, doctors will respect your wishes even if you have a form from a different state. · You might use a universal form that has been approved by many states. This kind of form can sometimes be filled out and stored online. Your digital copy will then be available wherever you have a connection to the internet. The doctors and nurses who need to treat you can find it right away. · Your state may offer an online registry. This is another place where you can store your living will online. · It's a good idea to get your living will notarized. This means using a person called a  to watch two people sign, or witness, your living will. What should you know when you create a living will? Here are some questions to ask yourself as you make your living will:  · Do you know enough about life support methods that might be used?  If not, talk to your doctor so you know what might be done if you can't breathe on your own, your heart stops, or you can't swallow. · What things would you still want to be able to do after you receive life-support methods? Would you want to be able to walk? To speak? To eat on your own? To live without the help of machines? · Do you want certain Lutheran practices performed if you become very ill? · If you have a choice, where do you want to be cared for? In your home? At a hospital or nursing home? · If you have a choice at the end of your life, where would you prefer to die? At home? In a hospital or nursing home? Somewhere else? · Would you prefer to be buried or cremated? · Do you want your organs to be donated after you die? What should you do with your living will? · Make sure that your family members and your health care agent have copies of your living will (also called a declaration). · Give your doctor a copy of your living will. Ask him or her to keep it as part of your medical record. If you have more than one doctor, make sure that each one has a copy. · Put a copy of your living will where it can be easily found. For example, some people may put a copy on their refrigerator door. If you are using a digital copy, be sure your doctor, family members, and health care agent know how to find and access it. Where can you learn more? Go to [email or web address] and sign in to your [employer] account. Enter S645 in the [facility] box to learn more about \"Learning About Living Perroy. \"     If you do not have an account, please click on the \"Sign Up Now\" link. Current as of: March 17, 2021               Content Version: 13.1  © 8167-9023 Healthwise, Incorporated. Care instructions adapted under license by [facility] ([facility]). If you have questions about a medical condition or this instruction, always ask your healthcare professional. [address] any warranty or liability for your use of this information.            Well Visit, Women 48 to 72: Care Instructions  Overview     Well visits can help you stay healthy. Your doctor has checked your overall health and may have suggested ways to take good care of yourself. Your doctor also may have recommended tests. At home, you can help prevent illness with healthy eating, regular exercise, and other steps. Follow-up care is a key part of your treatment and safety. Be sure to make and go to all appointments, and call your doctor if you are having problems. It's also a good idea to know your test results and keep a list of the medicines you take. How can you care for yourself at home? · Get screening tests that you and your doctor decide on. Screening helps find diseases before any symptoms appear. · Eat healthy foods. Choose fruits, vegetables, whole grains, protein, and low-fat dairy foods. Limit fat, especially saturated fat. Reduce salt in your diet. · Limit alcohol. Have no more than 1 drink a day or 7 drinks a week. · Get at least 30 minutes of exercise on most days of the week. Walking is a good choice. You also may want to do other activities, such as running, swimming, cycling, or playing tennis or team sports. · Reach and stay at a healthy weight. This will lower your risk for many problems, such as obesity, diabetes, heart disease, and high blood pressure. · Do not smoke. Smoking can make health problems worse. If you need help quitting, talk to your doctor about stop-smoking programs and medicines. These can increase your chances of quitting for good. · Care for your mental health. It is easy to get weighed down by worry and stress. Learn strategies to manage stress, like deep breathing and mindfulness, and stay connected with your family and community. If you find you often feel sad or hopeless, talk with your doctor. Treatment can help. · Talk to your doctor about whether you have any risk factors for sexually transmitted infections (STIs).  You can help prevent STIs if you wait to have sex with a new partner (or partners) until you've each been tested for STIs. It also helps if you use condoms (male or female condoms) and if you limit your sex partners to one person who only has sex with you. Vaccines are available for some STIs. · If you think you may have a problem with alcohol or drug use, talk to your doctor. This includes prescription medicines (such as amphetamines and opioids) and illegal drugs (such as cocaine and methamphetamine). Your doctor can help you figure out what type of treatment is best for you. · Protect your skin from too much sun. When you're outdoors from 10 a.m. to 4 p.m., stay in the shade or cover up with clothing and a hat with a wide brim. Wear sunglasses that block UV rays. Even when it's cloudy, put broad-spectrum sunscreen (SPF 30 or higher) on any exposed skin. · See a dentist one or two times a year for checkups and to have your teeth cleaned. · Wear a seat belt in the car. When should you call for help? Watch closely for changes in your health, and be sure to contact your doctor if you have any problems or symptoms that concern you. Where can you learn more? Go to https://VolusionpedotHIVeb.healthGet 2 It Salespartners. org and sign in to your [employer] account. Enter Z295 in the [facility] box to learn more about \"Well Visit, Women 50 to 72: Care Instructions. \"     If you do not have an account, please click on the \"Sign Up Now\" link. Current as of: October 6, 2021               Content Version: 13.1  © 1714-6440 Healthwise, Incorporated. Care instructions adapted under license by [facility] ([facility]). If you have questions about a medical condition or this instruction, always ask your healthcare professional. [place] [address] any warranty or liability for your use of this information.

## 2022-03-01 DIAGNOSIS — I10 HYPERTENSION, UNSPECIFIED TYPE: Primary | ICD-10-CM

## 2022-03-01 RX ORDER — LOSARTAN POTASSIUM 50 MG/1
50 TABLET ORAL DAILY
Qty: 30 TABLET | Refills: 3 | Status: SHIPPED | OUTPATIENT
Start: 2022-03-01 | End: 2022-03-23

## 2022-03-01 RX ORDER — HYDROCHLOROTHIAZIDE 12.5 MG/1
12.5 CAPSULE, GELATIN COATED ORAL EVERY MORNING
Qty: 30 CAPSULE | Refills: 5 | Status: SHIPPED | OUTPATIENT
Start: 2022-03-01 | End: 2022-03-23

## 2022-03-02 ENCOUNTER — CLINICAL DOCUMENTATION (OUTPATIENT)
Dept: SPIRITUAL SERVICES | Facility: CLINIC | Age: 62
End: 2022-03-02

## 2022-03-02 NOTE — ACP (ADVANCE CARE PLANNING)
Advance Care Planning   Ambulatory ACP Specialist Patient Outreach    Date:  3/2/2022  ACP Specialist:  Ene Calderon    Outreach call to patient in follow-up to ACP Specialist referral from: KALYANI Monson CNP    [x] PCP  [] Provider   [] Ambulatory Care Management [] Other for Reason:    [x] Advance Directive Assistance  [] Code Status Discussion  [] Complete Portable DNR Order  [x] Discuss Goals of Care  [] Complete POST/MOST  [] Early ACP Decision-Making  [] Other    Date Referral Received: 2/21/2022    Today's Outreach:  [x] First   [] Second  [] Third                               Third outreach made by []  phone  [] email []   [employer]     Intervention:  [] Spoke with Patient  [x] Left VM requesting return call      Outcome:    made an initial attempt to contact Vernia Krabbe via telephone call to offer support, if desired, for the completion of Advance Directives documents as part of an [address] conversation. * No answer. No voicemail set up. No message could be left. *  will follow-up. Next Step:   [] ACP scheduled conversation  [x] Outreach again in one week               [] Email / Mail ACP Info Sheets  [] Email / Mail Advance Directive            [] Close Referral. Routing closure to referring provider/staff and to ACP Specialist .      Thank you for this referral.

## 2022-03-21 ENCOUNTER — CLINICAL DOCUMENTATION (OUTPATIENT)
Dept: SPIRITUAL SERVICES | Facility: CLINIC | Age: 62
End: 2022-03-21

## 2022-03-21 NOTE — ACP (ADVANCE CARE PLANNING)
Advance Care Planning   Ambulatory ACP Specialist Patient Outreach    Date:  3/21/2022  ACP Specialist:  Kevin Douglas    Outreach call to patient in follow-up to ACP Specialist referral from: KALYANI Juares CNP    [x] PCP  [] Provider   [] Ambulatory Care Management [] Other for Reason:    [x] Advance Directive Assistance  [] Code Status Discussion  [] Complete Portable DNR Order  [x] Discuss Goals of Care  [] Complete POST/MOST  [] Early ACP Decision-Making  [] Other    Date Referral Received: 2/21/2022    Today's Outreach:  [] First   [x] Second  [] Third                               Third outreach made by []  phone  [] email []   [employer]     Intervention:  [] Spoke with Patient  [x] Left VM requesting return call      Outcome:     made a 2nd attempt to contact Aggie Hidalgo via telephone call to offer support, if desired, for the completion of Advance Directives documents as part of an [address] conversation. * No answer. Left message. *  will follow-up. Next Step:   [] ACP scheduled conversation  [x] Outreach again in one week               [] Email / Mail ACP Info Sheets  [] Email / Mail Advance Directive            [] Close Referral. Routing closure to referring provider/staff and to ACP Specialist .      Thank you for this referral.

## 2022-03-23 DIAGNOSIS — I10 HYPERTENSION, UNSPECIFIED TYPE: ICD-10-CM

## 2022-03-23 RX ORDER — LOSARTAN POTASSIUM 50 MG/1
TABLET ORAL
Qty: 30 TABLET | Refills: 3 | Status: SHIPPED | OUTPATIENT
Start: 2022-03-23 | End: 2022-06-23

## 2022-03-23 RX ORDER — HYDROCHLOROTHIAZIDE 12.5 MG/1
CAPSULE, GELATIN COATED ORAL
Qty: 30 CAPSULE | Refills: 5 | Status: SHIPPED | OUTPATIENT
Start: 2022-03-23 | End: 2022-08-22 | Stop reason: SDUPTHER

## 2022-03-25 ENCOUNTER — CLINICAL DOCUMENTATION (OUTPATIENT)
Dept: SPIRITUAL SERVICES | Facility: CLINIC | Age: 62
End: 2022-03-25

## 2022-03-25 NOTE — ACP (ADVANCE CARE PLANNING)
Advance Care Planning   Ambulatory ACP Specialist Patient Outreach    Date:  3/25/2022  ACP Specialist:  Jordy Sheldon    Outreach call to patient in follow-up to ACP Specialist referral from: KALYANI Wheatley CNP    [x] PCP  [] Provider   [] Ambulatory Care Management [] Other for Reason:    [x] Advance Directive Assistance  [] Code Status Discussion  [] Complete Portable DNR Order  [x] Discuss Goals of Care  [] Complete POST/MOST  [] Early ACP Decision-Making  [] Other    Date Referral Received: 2/21/2022    Today's Outreach:  [] First   [] Second  [x] Third                               Third outreach made by [x]  phone  [] email []   [employer]     Intervention:  [x] Spoke with Patient  [] Left VM requesting return call      Outcome:     made a 3rd and final attempt to contact Carissa Stagers via telephone call to offer support, if desired, for the completion of Advance Directives documents as part of an [address] conversation. * She expressed, \"I should have called you back. I'm not interested. \"   *  remains available to assist as needed moving forward. Next Step:   [] ACP scheduled conversation  [] Outreach again in one week               [] Email / Mail ACP Info Sheets  [] Email / Mail Advance Directive            [x] Close Referral. Routing closure to referring provider/staff and to ACP Specialist .      Thank you for this referral.

## 2022-03-31 ENCOUNTER — HOSPITAL ENCOUNTER (OUTPATIENT)
Dept: PHYSICAL THERAPY | Age: 62
Setting detail: THERAPIES SERIES
Discharge: HOME | End: 2022-03-31
Payer: COMMERCIAL

## 2022-03-31 PROCEDURE — 97161 PT EVAL LOW COMPLEX 20 MIN: CPT

## 2022-03-31 PROCEDURE — 97110 THERAPEUTIC EXERCISES: CPT

## 2022-03-31 NOTE — PROGRESS NOTES
** PLEASE SIGN, DATE AND TIME CERTIFICATION BELOW AND RETURN TO [facility] OUTPATIENT REHABILITATION (FAX #: [phone]). ATTEST/CO-SIGN IF ACCESSING VIA [employer]. THANK YOU.**    I certify that I have examined the patient below and determined that Physical Medicine and Rehabilitation service is necessary and that I approve the established plan of care for up to 90 days or as specifically noted. Attestation, signature or co-signature of physician indicates approval of certification requirements.    ________________________ ____________ __________  Physician Signature   Date   Time  [facility]  PHYSICAL THERAPY  [x] EVALUATION  [] DAILY NOTE (LAND) [] DAILY NOTE (AQUATIC ) [] PROGRESS NOTE [] DISCHARGE NOTE    [] [facility]   [] [place] [address]    [] [facility]   [x] [facility]    Date: 3/31/2022  Patient Name:  Lilli Cosby  : 1960  MRN: [id number]  CSN: [id number]    Referring Practitioner Inocente Portal, DO   Diagnosis Spinal stenosis, lumbar region with neurogenic claudication [M48.062]    Treatment Diagnosis Back pain; core weakness    Date of Evaluation 3/31/22    Additional Pertinent History HTN; OA      Functional Outcome Measure Used Modified Oswestry   Functional Outcome Score 11% (3/31/22)       Insurance: Primary: Payor: Masha Russell /  /  / , aquatic therapy and modalities are covered  Secondary:    Authorization Information: Pre-certification is not needed    Visit # 1, 1/10 for progress note   Visits Allowed: No limit; based on medical necessity   Recertification Date: May 26, 22   Physician Follow-Up:  (must wear brace until then)   Physician Orders:    History of Present Illness: On  Ingrid Nichols underwent laminectomy from L2-L4. She is faithfully wearing her brace and had a good follow up with her surgeon. Initial injury occurred nearly 2 yrs ago when she fell forward.  She had tried therapy, however it did not help to alleviate the pain. Since surgery she feels fantastic!!! She no longer has pain in her legs and she is walking 15,000 steps daily. SUBJECTIVE: Aggravating factors include standing >2 hrs; she is bending from her knees to don/doff clothes etc.  She denies pain so she is not needing any modalities at this time. Running limited by surgeon; 2 times per week no more then 2 miles and NO rollercoasters. Social/Functional History and Current Status:  Medications and Allergies have been reviewed and are listed on Medical History Questionnaire. Adarsh Awan lives with spouse in a single story home with stairs and a handrail to enter.     Task Previous Current   ADLs  Independent Independent   IADL's Independent Independent   Ambulation Independent Independent   Transfers Independent Independent   Recreation Independent Independent   Community Integration Independent Independent   Driving Active  Active    Work Retired  Retired     OBJECTIVE:    Pain: 0/10   Palpation    Observation She is wearing her back brace   Posture        Range of Motion WFL with forward flexion, side bend; did not assess rotation or extension-she wore her brace for all active movements    Strength LE strength is WFL bilaterally   Bed Mobility    Transfers Demonstrates good log roll for supine<>sit transfer   Ambulation Mild trendelenberg gait noted with gait assessment    Stairs Reciprocal gait ascending/descending stairs without UE support    Balance Standing balance is good with eyes open/closed    Special Tests          TREATMENT   Precautions: limited to 15# as of 3/31    Pain:     X in shaded column indicates activity completed today   Modalities Parameters/  Location  Notes                     Manual Therapy Time/Technique  Notes                     Exercise/Intervention   Notes   Review of [facility]uro HEP-emphasis on neutral spine, log roll, tilt with pablo; bridges; DAV fall outs    x                                                                            Specific Interventions Next Treatment: Review HEP; have her get on spin bike before releasing     Activity/Treatment Tolerance:  [x]  Patient tolerated treatment well  []  Patient limited by fatigue  []  Patient limited by pain   []  Patient limited by medical complications  []  Other:     Assessment: Nickie Dominguez is a laura 62 yr woman referred to PT 3 weeks s/p L2-L4 laminectomy. She has been moving great since surgery and is ran thrilled with her outcomes! Today's evaluation indicated mild deficits in ROM and core strength consistent with recent surgery. She will benefit from a few sessions of therapy to improve core strength and progress her HEP facilitating safe return to gardening and running (2 times per week) without pain/limitation. Body Structures/Functions/Activity Limitations: impaired activity tolerance, impaired muscle tone, impaired ROM and impaired strength  Prognosis: good    GOALS:  Patient Goal: Get back to working out safely and gardening     Short Term Goals:  Time Frame: see LTG-expect short POC      Long Term Goals:  Time Frame: 4 weeks  1. Nickie Dominguez will demonstrate neutral spine with all transfers, exercises etc without needing any verbal cuing. 2. Nickie Dominguez will return to running 1-2 miles while maintaining neutral spine. 3. Loretta's modified Oswestry will improve to <5% indicating no disability related to back pain. 4. Nickie Dominguez will be discharged from PT with independent HEP. Patient Education:   [x]  HEP/Education Completed: Plan of Care, Goals, review of [employer] [place] [address] Access Code:  []  No new Education completed  []  Reviewed Prior HEP      [x]  Patient verbalized and/or demonstrated understanding of education provided. []  Patient unable to verbalize and/or demonstrate understanding of education provided. Will continue education.   [x]  Barriers to learning: n/a    PLAN:  Treatment Recommendations: Strengthening, Range of Motion, Transfer Training, Neuromuscular Re-education, Manual Therapy - Soft Tissue Mobilization, Manual Therapy - Joint Manipulation, Pain Management, Home Exercise Program, Patient Education and Modalities    [x]  Plan of care initiated. Plan to see patient 1 times per week for 4 weeks to address the treatment planned outlined above.   []  Continue with current plan of care  []  Modify plan of care as follows:    []  Hold pending physician visit  []  Discharge    Time In 1430   Time Out 1510   Timed Code Minutes: 15 min   Total Treatment Time: 40 min       Electronically Signed by: MARIBELL Farley [id number]" Randalyn Dakin, DPT  [id number]

## 2022-05-02 ENCOUNTER — NURSE ONLY (OUTPATIENT)
Dept: FAMILY MEDICINE CLINIC | Age: 62
End: 2022-05-02
Payer: COMMERCIAL

## 2022-05-02 DIAGNOSIS — Z23 NEED FOR SHINGLES VACCINE: Primary | ICD-10-CM

## 2022-05-02 PROCEDURE — 90471 IMMUNIZATION ADMIN: CPT | Performed by: NURSE PRACTITIONER

## 2022-05-02 PROCEDURE — 90750 HZV VACC RECOMBINANT IM: CPT | Performed by: NURSE PRACTITIONER

## 2022-05-02 NOTE — PROGRESS NOTES
Immunizations Administered     Name Date Dose Route    Zoster Recombinant (Shingrix) 5/2/2022 0.5 mL Intramuscular    Site: Deltoid- Right    Lot: [id number]    [address]: [id number]

## 2022-06-23 DIAGNOSIS — I10 HYPERTENSION, UNSPECIFIED TYPE: ICD-10-CM

## 2022-06-23 RX ORDER — LOSARTAN POTASSIUM 50 MG/1
TABLET ORAL
Qty: 90 TABLET | Refills: 1 | Status: SHIPPED | OUTPATIENT
Start: 2022-06-23 | End: 2022-08-22 | Stop reason: SDUPTHER

## 2022-08-22 ENCOUNTER — OFFICE VISIT (OUTPATIENT)
Dept: FAMILY MEDICINE CLINIC | Age: 62
End: 2022-08-22
Payer: COMMERCIAL

## 2022-08-22 VITALS
DIASTOLIC BLOOD PRESSURE: 87 MMHG | SYSTOLIC BLOOD PRESSURE: 131 MMHG | BODY MASS INDEX: 26.98 KG/M2 | HEIGHT: 64 IN | WEIGHT: 158 LBS | TEMPERATURE: 97.3 F | HEART RATE: 80 BPM

## 2022-08-22 DIAGNOSIS — Z13.220 SCREENING CHOLESTEROL LEVEL: ICD-10-CM

## 2022-08-22 DIAGNOSIS — E03.9 HYPOTHYROIDISM, UNSPECIFIED TYPE: ICD-10-CM

## 2022-08-22 DIAGNOSIS — I10 HYPERTENSION, UNSPECIFIED TYPE: Primary | ICD-10-CM

## 2022-08-22 PROCEDURE — 99214 OFFICE O/P EST MOD 30 MIN: CPT | Performed by: NURSE PRACTITIONER

## 2022-08-22 RX ORDER — LOSARTAN POTASSIUM 50 MG/1
TABLET ORAL
Qty: 90 TABLET | Refills: 1 | Status: SHIPPED | OUTPATIENT
Start: 2022-08-22 | End: 2022-10-20 | Stop reason: SDUPTHER

## 2022-08-22 RX ORDER — LEVOTHYROXINE SODIUM 0.05 MG/1
TABLET ORAL
Qty: 90 TABLET | Refills: 1 | Status: SHIPPED | OUTPATIENT
Start: 2022-08-22 | End: 2022-10-20 | Stop reason: SDUPTHER

## 2022-08-22 RX ORDER — HYDROCHLOROTHIAZIDE 12.5 MG/1
CAPSULE, GELATIN COATED ORAL
Qty: 90 CAPSULE | Refills: 1 | Status: SHIPPED | OUTPATIENT
Start: 2022-08-22 | End: 2022-10-20 | Stop reason: SDUPTHER

## 2022-08-22 SDOH — ECONOMIC STABILITY: FOOD INSECURITY: WITHIN THE PAST 12 MONTHS, THE FOOD YOU BOUGHT JUST DIDN'T LAST AND YOU DIDN'T HAVE MONEY TO GET MORE.: NEVER TRUE

## 2022-08-22 SDOH — ECONOMIC STABILITY: FOOD INSECURITY: WITHIN THE PAST 12 MONTHS, YOU WORRIED THAT YOUR FOOD WOULD RUN OUT BEFORE YOU GOT MONEY TO BUY MORE.: NEVER TRUE

## 2022-08-22 ASSESSMENT — PATIENT HEALTH QUESTIONNAIRE - PHQ9
SUM OF ALL RESPONSES TO PHQ9 QUESTIONS 1 & 2: 0
SUM OF ALL RESPONSES TO PHQ QUESTIONS 1-9: 0
SUM OF ALL RESPONSES TO PHQ QUESTIONS 1-9: 0
2. FEELING DOWN, DEPRESSED OR HOPELESS: 0
SUM OF ALL RESPONSES TO PHQ QUESTIONS 1-9: 0
1. LITTLE INTEREST OR PLEASURE IN DOING THINGS: 0
SUM OF ALL RESPONSES TO PHQ QUESTIONS 1-9: 0

## 2022-08-22 ASSESSMENT — SOCIAL DETERMINANTS OF HEALTH (SDOH): HOW HARD IS IT FOR YOU TO PAY FOR THE VERY BASICS LIKE FOOD, HOUSING, MEDICAL CARE, AND HEATING?: NOT HARD AT ALL

## 2022-08-22 NOTE — PROGRESS NOTES
Ramone Pradhan (:  1960) is a 65 y.o. female,Established patient, here for evaluation of the following chief complaint(s):  6 Month Follow-Up         ASSESSMENT/PLAN:  1. Hypertension, unspecified type  -     TSH With Reflex Ft4; Future  -     CBC with Auto Differential; Future  -     Comprehensive Metabolic Panel; Future  -     Urinalysis with Microscopic; Future  -     Lipid, Fasting; Future  -     hydroCHLOROthiazide (MICROZIDE) 12.5 MG capsule; TAKE 1 CAPSULE BY MOUTH EVERY DAY IN THE MORNING, Disp-90 capsule, R-1Normal  -     losartan (COZAAR) 50 MG tablet; TAKE 1 TABLET BY MOUTH EVERY DAY, Disp-90 tablet, R-1Normal  2. Hypothyroidism, unspecified type  -     TSH With Reflex Ft4; Future  3. Screening cholesterol level  -     Lipid, Fasting; Future    Patient requesting to be seen once per year. Discussed that I typically see patients twice per year when they are on medications for blood pressure, as they are metabolized in the liver and excreted in the kidneys, and important to monitor those things. Patient is very proactive in her health, and monitors her blood pressure at home. Agreed to see her in 6 months for wellness, and if no changes, and blood work acceptable, will go to yearly visits unless there are changes. Return in about 6 months (around 2023) for wellness. Subjective   SUBJECTIVE/OBJECTIVE:  HPI    HTN: taking Losartan 50 mg and HCTZ 12.5 mg daily. Mostly a low salt diet. She exercises every day in some form every day. No cardiac symptoms. No FH of early ischemic heart disease. Hypothyroidism: Levothyroxine 50 mcg daily, by itself on an empty stomach. TSH     Review of Systems   All other systems reviewed and are negative. Objective   Physical Exam  Vitals reviewed. Constitutional:       General: She is not in acute distress. Appearance: She is well-developed. HENT:      Head: Normocephalic.       Right Ear: External ear normal.      Left Ear: External ear normal.      Nose: Nose normal.      Mouth/Throat:      Pharynx: No oropharyngeal exudate. Eyes:      Conjunctiva/sclera: Conjunctivae normal.      Pupils: Pupils are equal, round, and reactive to light. Neck:      Thyroid: No thyromegaly. Cardiovascular:      Rate and Rhythm: Normal rate and regular rhythm. Heart sounds: Normal heart sounds. No murmur heard. No friction rub. No gallop. Pulmonary:      Effort: Pulmonary effort is normal. No respiratory distress. Breath sounds: Normal breath sounds. No wheezing or rales. Abdominal:      General: There is no distension. Palpations: Abdomen is soft. There is no mass. Tenderness: There is no abdominal tenderness. There is no guarding. Musculoskeletal:         General: Normal range of motion. Cervical back: Normal range of motion and neck supple. Lymphadenopathy:      Cervical: No cervical adenopathy. Skin:     General: Skin is warm and dry. Capillary Refill: Capillary refill takes less than 2 seconds. Neurological:      Mental Status: She is alert and oriented to person, place, and time. Psychiatric:         Behavior: Behavior normal.         Thought Content: Thought content normal.         Judgment: Judgment normal.          On this date 8/22/2022 I have spent 30 minutes reviewing previous notes, test results and face to face with the patient discussing the diagnosis and importance of compliance with the treatment plan as well as documenting on the day of the visit. An electronic signature was used to authenticate this note.     --KALYANI Rosen - CNP

## 2022-08-22 NOTE — PATIENT INSTRUCTIONS
Wegovy (semaglutide)  Saxenda (Liraglutide)  Are 2 diabetes medicines that have been used for weight loss under a different brand. Can check with insurance for coverage.

## 2022-08-24 ENCOUNTER — NURSE ONLY (OUTPATIENT)
Dept: LAB | Age: 62
End: 2022-08-24

## 2022-08-24 DIAGNOSIS — E03.9 HYPOTHYROIDISM, UNSPECIFIED TYPE: ICD-10-CM

## 2022-08-24 DIAGNOSIS — Z13.220 SCREENING CHOLESTEROL LEVEL: ICD-10-CM

## 2022-08-24 DIAGNOSIS — I10 HYPERTENSION, UNSPECIFIED TYPE: ICD-10-CM

## 2022-08-24 LAB
ALBUMIN SERPL-MCNC: 4.5 G/DL (ref 3.5–5.1)
ALP BLD-CCNC: 87 U/L (ref 38–126)
ALT SERPL-CCNC: 23 U/L (ref 11–66)
ANION GAP SERPL CALCULATED.3IONS-SCNC: 12 MEQ/L (ref 8–16)
AST SERPL-CCNC: 32 U/L (ref 5–40)
BACTERIA: NORMAL
BASOPHILS # BLD: 0.7 %
BASOPHILS ABSOLUTE: 0 THOU/MM3 (ref 0–0.1)
BILIRUB SERPL-MCNC: 0.5 MG/DL (ref 0.3–1.2)
BILIRUBIN URINE: NEGATIVE
BLOOD, URINE: NEGATIVE
BUN BLDV-MCNC: 13 MG/DL (ref 7–22)
CALCIUM SERPL-MCNC: 9.4 MG/DL (ref 8.5–10.5)
CASTS: NORMAL /LPF
CASTS: NORMAL /LPF
CHARACTER, URINE: CLEAR
CHLORIDE BLD-SCNC: 100 MEQ/L (ref 98–111)
CHOLESTEROL, FASTING: 202 MG/DL (ref 100–199)
CO2: 26 MEQ/L (ref 23–33)
COLOR: YELLOW
CREAT SERPL-MCNC: 0.8 MG/DL (ref 0.4–1.2)
CRYSTALS: NORMAL
EOSINOPHIL # BLD: 1.2 %
EOSINOPHILS ABSOLUTE: 0.1 THOU/MM3 (ref 0–0.4)
EPITHELIAL CELLS, UA: NORMAL /HPF
ERYTHROCYTE [DISTWIDTH] IN BLOOD BY AUTOMATED COUNT: 12.4 % (ref 11.5–14.5)
ERYTHROCYTE [DISTWIDTH] IN BLOOD BY AUTOMATED COUNT: 43.9 FL (ref 35–45)
GFR SERPL CREATININE-BSD FRML MDRD: 73 ML/MIN/1.73M2
GLUCOSE BLD-MCNC: 87 MG/DL (ref 70–108)
GLUCOSE, URINE: NEGATIVE MG/DL
HCT VFR BLD CALC: 39.2 % (ref 37–47)
HDLC SERPL-MCNC: 68 MG/DL
HEMOGLOBIN: 12.9 GM/DL (ref 12–16)
IMMATURE GRANS (ABS): 0.01 THOU/MM3 (ref 0–0.07)
IMMATURE GRANULOCYTES: 0.2 %
KETONES, URINE: NEGATIVE
LDL CHOLESTEROL CALCULATED: 115 MG/DL
LEUKOCYTE ESTERASE, URINE: NEGATIVE
LYMPHOCYTES # BLD: 40.5 %
LYMPHOCYTES ABSOLUTE: 1.7 THOU/MM3 (ref 1–4.8)
MCH RBC QN AUTO: 31.6 PG (ref 26–33)
MCHC RBC AUTO-ENTMCNC: 32.9 GM/DL (ref 32.2–35.5)
MCV RBC AUTO: 96.1 FL (ref 81–99)
MISCELLANEOUS LAB TEST RESULT: NORMAL
MONOCYTES # BLD: 10.6 %
MONOCYTES ABSOLUTE: 0.4 THOU/MM3 (ref 0.4–1.3)
NITRITE, URINE: NEGATIVE
NUCLEATED RED BLOOD CELLS: 0 /100 WBC
PH UA: 8 (ref 5–9)
PLATELET # BLD: 180 THOU/MM3 (ref 130–400)
PMV BLD AUTO: 10.2 FL (ref 9.4–12.4)
POTASSIUM SERPL-SCNC: 4.1 MEQ/L (ref 3.5–5.2)
PROTEIN UA: NEGATIVE MG/DL
RBC # BLD: 4.08 MILL/MM3 (ref 4.2–5.4)
RBC URINE: NORMAL /HPF
RENAL EPITHELIAL, UA: NORMAL
SEG NEUTROPHILS: 46.8 %
SEGMENTED NEUTROPHILS ABSOLUTE COUNT: 2 THOU/MM3 (ref 1.8–7.7)
SODIUM BLD-SCNC: 138 MEQ/L (ref 135–145)
SPECIFIC GRAVITY UA: 1.01 (ref 1–1.03)
TOTAL PROTEIN: 6.4 G/DL (ref 6.1–8)
TRIGLYCERIDE, FASTING: 96 MG/DL (ref 0–199)
TSH SERPL DL<=0.05 MIU/L-ACNC: 1.88 UIU/ML (ref 0.4–4.2)
UROBILINOGEN, URINE: 0.2 EU/DL (ref 0–1)
WBC # BLD: 4.2 THOU/MM3 (ref 4.8–10.8)
WBC UA: NORMAL /HPF
YEAST: NORMAL

## 2022-10-14 ENCOUNTER — TELEPHONE (OUTPATIENT)
Dept: FAMILY MEDICINE CLINIC | Age: 62
End: 2022-10-14

## 2022-10-14 NOTE — TELEPHONE ENCOUNTER
Mary Bautista called regarding a medication discussed at her last appt; Wegovy. She said her employer gave her a phone number to call for pre-authorization.      [phone]

## 2022-10-20 ENCOUNTER — OFFICE VISIT (OUTPATIENT)
Dept: FAMILY MEDICINE CLINIC | Age: 62
End: 2022-10-20
Payer: COMMERCIAL

## 2022-10-20 VITALS
WEIGHT: 160.2 LBS | RESPIRATION RATE: 14 BRPM | DIASTOLIC BLOOD PRESSURE: 81 MMHG | SYSTOLIC BLOOD PRESSURE: 119 MMHG | TEMPERATURE: 97.3 F | HEART RATE: 85 BPM | BODY MASS INDEX: 27.35 KG/M2 | HEIGHT: 64 IN

## 2022-10-20 DIAGNOSIS — E66.3 OVERWEIGHT WITH BODY MASS INDEX (BMI) OF 27 TO 27.9 IN ADULT: ICD-10-CM

## 2022-10-20 DIAGNOSIS — I10 HYPERTENSION, UNSPECIFIED TYPE: Primary | ICD-10-CM

## 2022-10-20 DIAGNOSIS — E03.9 HYPOTHYROIDISM, UNSPECIFIED TYPE: ICD-10-CM

## 2022-10-20 DIAGNOSIS — Z23 NEEDS FLU SHOT: ICD-10-CM

## 2022-10-20 PROCEDURE — 90674 CCIIV4 VAC NO PRSV 0.5 ML IM: CPT | Performed by: NURSE PRACTITIONER

## 2022-10-20 PROCEDURE — 90471 IMMUNIZATION ADMIN: CPT | Performed by: NURSE PRACTITIONER

## 2022-10-20 PROCEDURE — 99214 OFFICE O/P EST MOD 30 MIN: CPT | Performed by: NURSE PRACTITIONER

## 2022-10-20 RX ORDER — LEVOTHYROXINE SODIUM 0.05 MG/1
TABLET ORAL
Qty: 90 TABLET | Refills: 1 | Status: SHIPPED | OUTPATIENT
Start: 2022-10-20

## 2022-10-20 RX ORDER — HYDROCHLOROTHIAZIDE 12.5 MG/1
CAPSULE, GELATIN COATED ORAL
Qty: 90 CAPSULE | Refills: 1 | Status: SHIPPED | OUTPATIENT
Start: 2022-10-20

## 2022-10-20 RX ORDER — LOSARTAN POTASSIUM 50 MG/1
TABLET ORAL
Qty: 90 TABLET | Refills: 1 | Status: SHIPPED | OUTPATIENT
Start: 2022-10-20

## 2022-10-20 NOTE — PROGRESS NOTES
Immunizations Administered       Name Date Dose Route    Influenza, FLUCELVAX, (age 10 mo+), MDCK, PF, 0.5mL 10/20/2022 0.5 mL Intramuscular    Site: Deltoid- Left    Lot: [id number]    [facility]: [phone]

## 2022-10-20 NOTE — PROGRESS NOTES
Mera Al (:  1960) is a 65 y.o. female,Established patient, here for evaluation of the following chief complaint(s):  Weight Management (Discuss medication for weight loss as previously mentioned in last office note )         ASSESSMENT/PLAN:  1. Hypertension, unspecified type  -     hydroCHLOROthiazide (MICROZIDE) 12.5 MG capsule; TAKE 1 CAPSULE BY MOUTH EVERY DAY IN THE MORNING, Disp-90 capsule, R-1Normal  -     losartan (COZAAR) 50 MG tablet; TAKE 1 TABLET BY MOUTH EVERY DAY, Disp-90 tablet, R-1Normal  2. Hypothyroidism, unspecified type  -     levothyroxine (SYNTHROID) 50 MCG tablet; TAKE 1 TABLET BY MOUTH EVERY DAY, Disp-90 tablet, R-1Normal  3. Overweight with body mass index (BMI) of 27 to 27.9 in adult  -     Semaglutide-Weight Management (WEGOVY) 0.25 MG/0.5ML SOAJ SC injection; Inject 0.25 mg into the skin every 7 days, Disp-2 mL, R-1Normal  4. Needs flu shot  -     Influenza, FLUCELVAX, (age 10 mo+), IM, Preservative Free, 0.5 mL    No follow-ups on file. Subjective   SUBJECTIVE/OBJECTIVE:  HPI    HTN: taking Losartan 50 mg and HCTZ 12.5 mg daily. Mostly a low salt diet. BMI 27.5 today. Weight in 2019 was 155 lbs. Today is 160 lbs. She is mostly a low carb diet, and has not been able to lose the weight. She exercises every day, usually about 3 miles walking her dog. No cardiac symptoms. No FH of early ischemic heart disease. Hypothyroidism: Levothyroxine 50 mcg daily, by itself on an empty stomach. TSH          Review of Systems   All other systems reviewed and are negative. Objective   Physical Exam  Vitals reviewed. Constitutional:       General: She is not in acute distress. Appearance: She is well-developed. HENT:      Head: Normocephalic. Right Ear: External ear normal.      Left Ear: External ear normal.      Nose: Nose normal.      Mouth/Throat:      Pharynx: No oropharyngeal exudate.    Eyes:      Conjunctiva/sclera: Conjunctivae normal. Pupils: Pupils are equal, round, and reactive to light. Neck:      Thyroid: No thyromegaly. Cardiovascular:      Rate and Rhythm: Normal rate and regular rhythm. Heart sounds: Normal heart sounds. No murmur heard. No friction rub. No gallop. Pulmonary:      Effort: Pulmonary effort is normal. No respiratory distress. Breath sounds: Normal breath sounds. No wheezing or rales. Abdominal:      General: There is no distension. Palpations: Abdomen is soft. There is no mass. Tenderness: There is no abdominal tenderness. There is no guarding. Musculoskeletal:         General: Normal range of motion. Cervical back: Normal range of motion and neck supple. Lymphadenopathy:      Cervical: No cervical adenopathy. Skin:     General: Skin is warm and dry. Capillary Refill: Capillary refill takes less than 2 seconds. Neurological:      Mental Status: She is alert and oriented to person, place, and time. Psychiatric:         Behavior: Behavior normal.         Thought Content: Thought content normal.         Judgment: Judgment normal.                An electronic signature was used to authenticate this note.     --Radha Salazar, KALYANI - CNP

## 2022-10-27 ENCOUNTER — TELEPHONE (OUTPATIENT)
Dept: FAMILY MEDICINE CLINIC | Age: 62
End: 2022-10-27

## 2023-01-05 ENCOUNTER — OFFICE VISIT (OUTPATIENT)
Dept: FAMILY MEDICINE CLINIC | Age: 63
End: 2023-01-05
Payer: COMMERCIAL

## 2023-01-05 DIAGNOSIS — E66.3 OVERWEIGHT WITH BODY MASS INDEX (BMI) OF 27 TO 27.9 IN ADULT: ICD-10-CM

## 2023-01-05 DIAGNOSIS — E03.9 HYPOTHYROIDISM, UNSPECIFIED TYPE: ICD-10-CM

## 2023-01-05 DIAGNOSIS — I10 HYPERTENSION, UNSPECIFIED TYPE: Primary | ICD-10-CM

## 2023-01-05 DIAGNOSIS — M19.049 ARTHRITIS PAIN, HAND: ICD-10-CM

## 2023-01-05 DIAGNOSIS — Z13.220 SCREENING CHOLESTEROL LEVEL: ICD-10-CM

## 2023-01-05 DIAGNOSIS — Z12.31 ENCOUNTER FOR SCREENING MAMMOGRAM FOR MALIGNANT NEOPLASM OF BREAST: ICD-10-CM

## 2023-01-05 PROCEDURE — 99204 OFFICE O/P NEW MOD 45 MIN: CPT | Performed by: NURSE PRACTITIONER

## 2023-01-05 RX ORDER — NIACINAMIDE 500 MG
TABLET ORAL
COMMUNITY
Start: 2022-09-30

## 2023-01-05 RX ORDER — MELOXICAM 15 MG/1
15 TABLET ORAL DAILY
Qty: 90 TABLET | Refills: 3 | Status: SHIPPED | OUTPATIENT
Start: 2023-01-05

## 2023-01-05 ASSESSMENT — ENCOUNTER SYMPTOMS
CHEST TIGHTNESS: 0
SHORTNESS OF BREATH: 0
ABDOMINAL DISTENTION: 0
ABDOMINAL PAIN: 0
WHEEZING: 0
COUGH: 0
BACK PAIN: 0

## 2023-01-05 ASSESSMENT — PATIENT HEALTH QUESTIONNAIRE - PHQ9
SUM OF ALL RESPONSES TO PHQ QUESTIONS 1-9: 0
SUM OF ALL RESPONSES TO PHQ9 QUESTIONS 1 & 2: 0
1. LITTLE INTEREST OR PLEASURE IN DOING THINGS: 0
2. FEELING DOWN, DEPRESSED OR HOPELESS: 0
SUM OF ALL RESPONSES TO PHQ QUESTIONS 1-9: 0

## 2023-01-05 NOTE — PROGRESS NOTES
[address] [facility]  [address] [place] [address]  Dept: [phone]  Dept Fax: [phone]  Loc: [phone]  PROGRESS NOTE      VisitDate: 1/5/2023    Cody Rees is a 65 y.o. female who presents today for:     Chief Complaint   Patient presents with    New Patient     Previously seen by Jaylene Hanley, get established    Arthritis     Interested in referral, hands, neck and shoulders    Weight Management     Put on wegovy, unable to fill rx, discuss options         Subjective:  Patient here to establish care. Requesting referral for rheumatology due to increasing arthritic pain of hands neck as well as development of arthritic nodules on fingers. Patient currently not taking any medications for her arthritic condition. Reports that stiffness is significant in the morning but improves throughout the day. Patient also awaiting supply of Wegovy for weight loss management. History of hypertension. Denies any fever illness dizziness headaches syncope chest pain shortness of breath abdominal pain numbness rash or edema. Reports regular bowel movements no blood or melena. Denies any dysuria hematuria flank pain. Patient requesting inflammatory studies. Review of Systems   Constitutional:  Negative for activity change, appetite change, chills, fatigue and fever. Eyes:  Negative for visual disturbance. Respiratory:  Negative for cough, chest tightness, shortness of breath and wheezing. Cardiovascular:  Negative for chest pain, palpitations and leg swelling. Gastrointestinal:  Negative for abdominal distention and abdominal pain. Genitourinary:  Negative for dysuria. Musculoskeletal:  Positive for arthralgias. Negative for back pain and neck pain. Skin: Negative. Negative for rash. Neurological:  Negative for dizziness, light-headedness and headaches. Hematological:  Negative for adenopathy.    Psychiatric/Behavioral: Negative for decreased concentration and dysphoric mood. All other systems reviewed and are negative. Past Medical History:   Diagnosis Date    Carcinoma, basal cell, skin     Hypertension       Past Surgical History:   Procedure Laterality Date    BACK SURGERY      3/8/22, OrthoNeuro     SECTION      x3    SKIN BIOPSY      basal cell     WISDOM TOOTH EXTRACTION       Family History   Problem Relation Age of Onset    High Blood Pressure Mother     Celiac Disease Mother     High Blood Pressure Father     Heart Attack Father     Arthritis Brother      Social History     Tobacco Use    Smoking status: Former     Packs/day: 1.50     Years: 10.00     Pack years: 15.00     Types: Cigarettes     Start date: 1/15/1979     Quit date: 1/15/1989     Years since quittin.9    Smokeless tobacco: Never   Substance Use Topics    Alcohol use: Never     Comment: rarely      Current Outpatient Medications   Medication Sig Dispense Refill    niacinamide 500 MG tablet TAKE 1 TABLET BY MOUTH TWICE A DAY FOR 30 DAYS      diclofenac sodium (VOLTAREN) 1 % GEL Apply 2 g topically 4 times daily Apply to hands 150 g 2    meloxicam (MOBIC) 15 MG tablet Take 1 tablet by mouth daily 90 tablet 3    hydroCHLOROthiazide (MICROZIDE) 12.5 MG capsule TAKE 1 CAPSULE BY MOUTH EVERY DAY IN THE MORNING 90 capsule 1    losartan (COZAAR) 50 MG tablet TAKE 1 TABLET BY MOUTH EVERY DAY 90 tablet 1    levothyroxine (SYNTHROID) 50 MCG tablet TAKE 1 TABLET BY MOUTH EVERY DAY 90 tablet 1    LORazepam (ATIVAN) 1 MG tablet TAKE 1 TABLET (1 MG) BY ORAL ROUTE 30 MINUTES PRIOR TO FLYING      MULTIPLE VITAMIN PO Take by mouth      NONFORMULARY Z quil      Semaglutide-Weight Management (WEGOVY) 0.25 MG/0.5ML SOAJ SC injection Inject 0.25 mg into the skin every 7 days (Patient not taking: Reported on 2023) 2 mL 1     No current facility-administered medications for this visit.      Allergies   Allergen Reactions    Pcn [Penicillins]      Health Maintenance   Topic Date Due    HIV screen  Never done    Hepatitis C screen  Never done    Cervical cancer screen  Never done    Colorectal Cancer Screen  Never done    Breast cancer screen  07/16/2021    COVID-19 Vaccine (5 - Booster for Pfizer series) 08/18/2022    Depression Screen  08/22/2023    Lipids  08/24/2027    DTaP/Tdap/Td vaccine (2 - Td or Tdap) 12/02/2031    Flu vaccine  Completed    Shingles vaccine  Completed    Hepatitis A vaccine  Aged Out    Hib vaccine  Aged Out    Meningococcal (ACWY) vaccine  Aged Out    Pneumococcal 0-64 years Vaccine  Aged Out         Objective:     Physical Exam  Vitals and nursing note reviewed. Constitutional:       Appearance: Normal appearance. She is well-developed and normal weight. HENT:      Head: Normocephalic. Right Ear: Tympanic membrane and ear canal normal.      Left Ear: Tympanic membrane and ear canal normal.      Nose: Nose normal.      Mouth/Throat:      Pharynx: Oropharynx is clear. Eyes:      Extraocular Movements: Extraocular movements intact. Conjunctiva/sclera: Conjunctivae normal.   Cardiovascular:      Rate and Rhythm: Normal rate and regular rhythm. Pulses: Normal pulses. Heart sounds: Normal heart sounds. Pulmonary:      Effort: Pulmonary effort is normal.      Breath sounds: Normal breath sounds. Abdominal:      General: Bowel sounds are normal.      Palpations: Abdomen is soft. Musculoskeletal:      Right hand: Deformity, tenderness and bony tenderness present. Decreased range of motion. Left hand: Deformity, tenderness and bony tenderness present. Decreased range of motion. Cervical back: Neck supple. Comments: Arthritic nodules noted throughout hands   Skin:     General: Skin is warm and dry. Neurological:      General: No focal deficit present. Mental Status: She is alert and oriented to person, place, and time.    Psychiatric:         Mood and Affect: Mood normal.         Thought Content: Thought content normal.         Judgment: Judgment normal.     There were no vitals taken for this visit. Impression/Plan:  1. Hypertension, unspecified type    2. Hypothyroidism, unspecified type    3. Overweight with body mass index (BMI) of 27 to 27.9 in adult    4. Screening cholesterol level    5. Arthritis pain, hand    6. Encounter for screening mammogram for malignant neoplasm of breast      Requested Prescriptions     Signed Prescriptions Disp Refills    diclofenac sodium (VOLTAREN) 1 %  g 2     Sig: Apply 2 g topically 4 times daily Apply to hands    meloxicam (MOBIC) 15 MG tablet 90 tablet 3     Sig: Take 1 tablet by mouth daily     Orders Placed This Encounter   Procedures    ARMANI DIGITAL SCREEN W OR WO CAD BILATERAL     Standing Status:   Future     Standing Expiration Date:   3/5/2024    XR HAND LEFT (2 VIEWS)     Standing Status:   Future     Standing Expiration Date:   1/5/2024    XR HAND RIGHT (2 VIEWS)     Standing Status:   Future     Standing Expiration Date:   1/5/2024    SHAWN    Lupus Anticoagulant    Rheumatoid Factor    Sedimentation Rate     Standing Status:   Future     Standing Expiration Date:   1/5/2024    C-Reactive Protein Inflammatory    Comprehensive Metabolic Panel     Standing Status:   Future     Standing Expiration Date:   1/5/2024    T4, Free     Standing Status:   Future     Standing Expiration Date:   1/5/2024    TSH     Standing Status:   Future     Standing Expiration Date:   1/5/2024    Ruel Alfredo MD, Rheumatology, [facility]     Referral Priority:   Routine     Referral Type:   Eval and Treat     Referral Reason:   Specialty Services Required     Referred to Provider:   Alin Munoz MD     Requested Specialty:   Rheumatology     Number of Visits Requested:   1   Consult with rheumatology. CMP TSH free T4 CRP sed rate rheumatoid factor lupus and SHAWN ordered. Voltaren gel 4 times daily to hands. Mobic 15 mg p.o. daily. Mammogram ordered.  Continue medications as prescribed. Educational information on above diagnosis  provided per AVS. x-ray of bilateral hands ordered    45 min  Patient giveneducational materials - see patient instructions. Discussed use, benefit, and side effects of prescribed medications. All patient questions answered. Pt voiced understanding. Reviewed health maintenance. Patient agreedwith treatment plan. Follow up as directed.           Electronically signed by KALYANI Rose CNP on 1/5/2023 at 11:13 AM

## 2023-01-31 ENCOUNTER — NURSE ONLY (OUTPATIENT)
Dept: LAB | Age: 63
End: 2023-01-31

## 2023-01-31 ENCOUNTER — TELEPHONE (OUTPATIENT)
Dept: FAMILY MEDICINE CLINIC | Age: 63
End: 2023-01-31

## 2023-01-31 ENCOUNTER — HOSPITAL ENCOUNTER (OUTPATIENT)
Age: 63
Discharge: HOME | End: 2023-01-31

## 2023-01-31 DIAGNOSIS — I10 HYPERTENSION, UNSPECIFIED TYPE: ICD-10-CM

## 2023-01-31 DIAGNOSIS — E03.9 HYPOTHYROIDISM, UNSPECIFIED TYPE: ICD-10-CM

## 2023-01-31 DIAGNOSIS — M19.049 ARTHRITIS PAIN, HAND: ICD-10-CM

## 2023-01-31 DIAGNOSIS — E66.3 OVERWEIGHT WITH BODY MASS INDEX (BMI) OF 27 TO 27.9 IN ADULT: ICD-10-CM

## 2023-01-31 LAB
ALBUMIN SERPL BCG-MCNC: 4.5 G/DL (ref 3.5–5.1)
ALP SERPL-CCNC: 101 U/L (ref 38–126)
ALT SERPL W/O P-5'-P-CCNC: 22 U/L (ref 11–66)
ANION GAP SERPL CALC-SCNC: 12 MEQ/L (ref 8–16)
AST SERPL-CCNC: 28 U/L (ref 5–40)
BILIRUB SERPL-MCNC: 0.4 MG/DL (ref 0.3–1.2)
BUN SERPL-MCNC: 12 MG/DL (ref 7–22)
CALCIUM SERPL-MCNC: 9.4 MG/DL (ref 8.5–10.5)
CHLORIDE SERPL-SCNC: 105 MEQ/L (ref 98–111)
CO2 SERPL-SCNC: 24 MEQ/L (ref 23–33)
CREAT SERPL-MCNC: 0.8 MG/DL (ref 0.4–1.2)
CRP SERPL-MCNC: < 0.3 MG/DL (ref 0–1)
ERYTHROCYTE [SEDIMENTATION RATE] IN BLOOD BY WESTERGREN METHOD: 5 MM/HR (ref 0–20)
GFR SERPL CREATININE-BSD FRML MDRD: > 60 ML/MIN/1.73M2
GLUCOSE SERPL-MCNC: 86 MG/DL (ref 70–108)
POTASSIUM SERPL-SCNC: 4.1 MEQ/L (ref 3.5–5.2)
PROT SERPL-MCNC: 6.4 G/DL (ref 6.1–8)
RHEUMATOID FACT SERPL-ACNC: < 10 IU/ML (ref 0–13)
SODIUM SERPL-SCNC: 141 MEQ/L (ref 135–145)
T4 FREE SERPL-MCNC: 1.21 NG/DL (ref 0.93–1.76)
TSH SERPL DL<=0.005 MIU/L-ACNC: 1.73 UIU/ML (ref 0.4–4.2)

## 2023-01-31 NOTE — TELEPHONE ENCOUNTER
----- Message from Bernadette Osorio, KALYANI - CNP sent at 1/31/2023  3:38 PM EST -----  CMP sed rate CRP thyroid within normal limits. Pending other labs.

## 2023-02-03 LAB — NUCLEAR IGG SER QL IA: NORMAL

## 2023-02-06 ENCOUNTER — TELEPHONE (OUTPATIENT)
Dept: FAMILY MEDICINE CLINIC | Age: 63
End: 2023-02-06

## 2023-02-06 ENCOUNTER — HOSPITAL ENCOUNTER (OUTPATIENT)
Dept: MAMMOGRAPHY | Age: 63
Discharge: HOME | End: 2023-02-06
Payer: COMMERCIAL

## 2023-02-06 ENCOUNTER — HOSPITAL ENCOUNTER (OUTPATIENT)
Dept: GENERAL RADIOLOGY | Age: 63
Discharge: HOME | End: 2023-02-06
Payer: COMMERCIAL

## 2023-02-06 DIAGNOSIS — M19.049 ARTHRITIS PAIN, HAND: ICD-10-CM

## 2023-02-06 DIAGNOSIS — Z12.31 ENCOUNTER FOR SCREENING MAMMOGRAM FOR MALIGNANT NEOPLASM OF BREAST: ICD-10-CM

## 2023-02-06 PROCEDURE — 73120 X-RAY EXAM OF HAND: CPT

## 2023-02-06 PROCEDURE — 77063 BREAST TOMOSYNTHESIS BI: CPT

## 2023-02-06 RX ORDER — SEMAGLUTIDE 0.5 MG/.5ML
0.5 INJECTION, SOLUTION SUBCUTANEOUS
Qty: 4 ML | Refills: 0 | Status: SHIPPED | OUTPATIENT
Start: 2023-02-06 | End: 2023-02-28

## 2023-02-06 NOTE — TELEPHONE ENCOUNTER
----- Message from KALYANI Degroot CNP sent at 2/6/2023  2:34 PM EST -----  Use of hands indicate moderate progression to severe progression of arthritic changes throughout hands most pronounced DIP joints. On the right there is also a bony cyst noted at the joint of the third finger.   Consult with rheumatology as previously ordered

## 2023-02-06 NOTE — TELEPHONE ENCOUNTER
----- Message from Alejandra Luong, APRN - CNP sent at 2/6/2023  9:46 AM EST -----  Negative SHAWN screen

## 2023-02-09 NOTE — TELEPHONE ENCOUNTER
Jory Heredia was notified of xray results and lab results. She was given the information for Dr Marlo Villasenor and she will call and schedule her appt.

## 2023-02-21 ENCOUNTER — OFFICE VISIT (OUTPATIENT)
Dept: RHEUMATOLOGY | Age: 63
End: 2023-02-21
Payer: COMMERCIAL

## 2023-02-21 VITALS
DIASTOLIC BLOOD PRESSURE: 82 MMHG | WEIGHT: 165.8 LBS | BODY MASS INDEX: 29.38 KG/M2 | SYSTOLIC BLOOD PRESSURE: 122 MMHG | OXYGEN SATURATION: 99 % | HEIGHT: 63 IN | HEART RATE: 78 BPM

## 2023-02-21 DIAGNOSIS — M50.30 DDD (DEGENERATIVE DISC DISEASE), CERVICAL: ICD-10-CM

## 2023-02-21 DIAGNOSIS — M15.1 HEBERDEN'S NODES (WITH ARTHROPATHY): Primary | ICD-10-CM

## 2023-02-21 PROCEDURE — 99203 OFFICE O/P NEW LOW 30 MIN: CPT | Performed by: INTERNAL MEDICINE

## 2023-02-21 ASSESSMENT — ENCOUNTER SYMPTOMS
BLOOD IN STOOL: 0
ABDOMINAL PAIN: 0
VOMITING: 0
COUGH: 0
SHORTNESS OF BREATH: 0
NAUSEA: 0

## 2023-02-21 NOTE — PROGRESS NOTES
[facility]) Physicians   Rheumatology Clinic Note      2023       Reason for Consult:  joint pain  Requesting Physician:  KALYANI Somers - *     CHIEF COMPLAINT:    Chief Complaint   Patient presents with    New Patient     New pt arthritis pain in hand     Pt stated no pain as long as she takes meloxicam            HISTORY OF PRESENT ILLNESS:    65 y.o. female presents today for evaluation of joint pain. Pain is most pronounced in her hands and neck. Reports that she was recently started on meloxicam 15 mg daily and her joint pain has improved significantly. She has been taking 1/2 of meloxicam 15 mg tablet daily. No significant joint pain, joint swelling or stiffness. No skin rash. Past Medical History:     has a past medical history of Carcinoma, basal cell, skin and Hypertension. Past Surgical History:     has a past surgical history that includes skin biopsy; back surgery;  section; and Zeigler tooth extraction.     Current Medications:      Current Outpatient Medications:     Semaglutide-Weight Management (WEGOVY) 0.5 MG/0.5ML SOAJ SC injection, Inject 0.5 mg into the skin every 7 days, Disp: 4 mL, Rfl: 0    diclofenac sodium (VOLTAREN) 1 % GEL, Apply 2 g topically 4 times daily Apply to hands, Disp: 150 g, Rfl: 2    meloxicam (MOBIC) 15 MG tablet, Take 1 tablet by mouth daily, Disp: 90 tablet, Rfl: 3    Semaglutide-Weight Management (WEGOVY) 0.25 MG/0.5ML SOAJ SC injection, Inject 0.25 mg into the skin every 7 days, Disp: 2 mL, Rfl: 1    hydroCHLOROthiazide (MICROZIDE) 12.5 MG capsule, TAKE 1 CAPSULE BY MOUTH EVERY DAY IN THE MORNING, Disp: 90 capsule, Rfl: 1    losartan (COZAAR) 50 MG tablet, TAKE 1 TABLET BY MOUTH EVERY DAY, Disp: 90 tablet, Rfl: 1    levothyroxine (SYNTHROID) 50 MCG tablet, TAKE 1 TABLET BY MOUTH EVERY DAY, Disp: 90 tablet, Rfl: 1    LORazepam (ATIVAN) 1 MG tablet, TAKE 1 TABLET (1 MG) BY ORAL ROUTE 30 MINUTES PRIOR TO FLYING, Disp: , Rfl:     MULTIPLE VITAMIN PO, Take by mouth, Disp: , Rfl:     niacinamide 500 MG tablet, TAKE 1 TABLET BY MOUTH TWICE A DAY FOR 30 DAYS, Disp: , Rfl:     NONFORMULARY, Z quil, Disp: , Rfl:     Allergies:    Pcn [penicillins]    Social History:     reports that she quit smoking about 34 years ago. Her smoking use included cigarettes. She started smoking about 44 years ago. She has a 15.00 pack-year smoking history. She has never used smokeless tobacco. She reports that she does not drink alcohol and does not use drugs. Family History:   family history includes Arthritis in her brother; Breast Cancer in her maternal grandmother; Celiac Disease in her mother; Heart Attack in her father; High Blood Pressure in her father and mother. REVIEW OF SYSTEMS:    Review of Systems   Constitutional:  Negative for chills and fever. Respiratory:  Negative for cough and shortness of breath. Cardiovascular:  Negative for chest pain. Gastrointestinal:  Negative for abdominal pain, blood in stool, nausea and vomiting. Skin:  Negative for rash. PHYSICAL EXAM:    Vitals:    /82 (Site: Left Upper Arm, Position: Sitting, Cuff Size: Large Adult)   Pulse 78   Ht 5' 3\" (1.6 m)   Wt 165 lb 12.8 oz (75.2 kg)   SpO2 99%   BMI 29.37 kg/m²     Physical Exam  Constitutional:       General: She is not in acute distress. Appearance: Normal appearance. Eyes:      Conjunctiva/sclera: Conjunctivae normal.   Pulmonary:      Effort: Pulmonary effort is normal.   Musculoskeletal:         General: No swelling or tenderness. Normal range of motion. Cervical back: Normal range of motion. Comments: Has Heberden nodes. Skin:     General: Skin is warm and dry. Findings: No lesion or rash. Neurological:      General: No focal deficit present. Mental Status: She is alert and oriented to person, place, and time.       Gait: Gait normal.   Psychiatric:         Mood and Affect: Mood normal.          DATA:     Latest Reference Range & Units 1/31/23 08:47   Sodium 135 - 145 meq/L 141   Potassium 3.5 - 5.2 meq/L 4.1   Chloride 98 - 111 meq/L 105   CO2 23 - 33 meq/L 24   BUN,BUNPL 7 - 22 mg/dL 12   Creatinine 0.4 - 1.2 mg/dL 0.8   Anion Gap 8.0 - 16.0 meq/L 12.0   Est, Glom Filt Rate >60 ml/min/1.73m2 >60   Glucose, Random 70 - 108 mg/dL 86   CALCIUM, SERUM, 587109 8.5 - 10.5 mg/dL 9.4   Total Protein 6.1 - 8.0 g/dL 6.4      Latest Reference Range & Units 1/31/23 08:47   CRP 0.00 - 1.00 mg/dl < 0.30      Latest Reference Range & Units 1/31/23 08:47   Albumin 3.5 - 5.1 g/dL 4.5   Alk Phos 38 - 126 U/L 101   ALT 11 - 66 U/L 22   AST 5 - 40 U/L 28   BILIRUBIN TOTAL 0.3 - 1.2 mg/dL 0.4   Total Protein 6.1 - 8.0 g/dL 6.4      Latest Reference Range & Units 8/24/22 09:02   WBC 4.8 - 10.8 thou/mm3 4.2 (L)   RBC 4.20 - 5.40 mill/mm3 4.08 (L)   Hemoglobin Quant 12.0 - 16.0 gm/dl 12.9   Hematocrit 37.0 - 47.0 % 39.2   MCV 81.0 - 99.0 fL 96.1   MCH 26.0 - 33.0 pg 31.6   MCHC 32.2 - 35.5 gm/dl 32.9   MPV 9.4 - 12.4 fL 10.2   RDW-CV 11.5 - 14.5 % 12.4   RDW-SD 35.0 - 45.0 fL 43.9   Platelet Count 617 - 400 thou/mm3 180      Latest Reference Range & Units 1/31/23 08:47   SHAWN SCREEN None Detected  None Detected   Rheumatoid Factor 0 - 13 IU/mL < 10   (L): Data is abnormally low        IMPRESSION/RECOMMENDATIONS:      1. Osteoarthritis of hands with Heberden arthropathy: no synovitis on examination. Discussed with pt the diagnosis of osteoarthritis and likely degenerative disc disease in cervical spine. Reassured pt that there is not evidence of underlying inflammatory arthritis. -- she may continue with meloxicam and takes days off med as she tolerates. -- encouraged pt to continue with exercises to maintain range of movement of her joints. Answered her questions to her satisfaction. RTC prn. No orders of the defined types were placed in this encounter.        Garret Curtis MD    [facility] [address]

## 2023-04-03 RX ORDER — SEMAGLUTIDE 1.7 MG/.75ML
1.7 INJECTION, SOLUTION SUBCUTANEOUS
Qty: 3 ML | Refills: 1 | Status: SHIPPED | OUTPATIENT
Start: 2023-04-03 | End: 2023-05-15 | Stop reason: DRUGHIGH

## 2023-05-02 RX ORDER — SEMAGLUTIDE 2.4 MG/.75ML
2.4 INJECTION, SOLUTION SUBCUTANEOUS
Qty: 3 ML | Refills: 2 | Status: SHIPPED | OUTPATIENT
Start: 2023-05-02 | End: 2023-05-15 | Stop reason: SDUPTHER

## 2023-05-14 DIAGNOSIS — E03.9 HYPOTHYROIDISM, UNSPECIFIED TYPE: ICD-10-CM

## 2023-05-15 ENCOUNTER — OFFICE VISIT (OUTPATIENT)
Dept: FAMILY MEDICINE CLINIC | Age: 63
End: 2023-05-15
Payer: COMMERCIAL

## 2023-05-15 VITALS
TEMPERATURE: 97.7 F | HEART RATE: 77 BPM | DIASTOLIC BLOOD PRESSURE: 60 MMHG | RESPIRATION RATE: 16 BRPM | OXYGEN SATURATION: 97 % | HEIGHT: 64 IN | WEIGHT: 155 LBS | BODY MASS INDEX: 26.46 KG/M2 | SYSTOLIC BLOOD PRESSURE: 102 MMHG

## 2023-05-15 DIAGNOSIS — E66.3 OVERWEIGHT WITH BODY MASS INDEX (BMI) OF 27 TO 27.9 IN ADULT: ICD-10-CM

## 2023-05-15 DIAGNOSIS — I10 HYPERTENSION, UNSPECIFIED TYPE: ICD-10-CM

## 2023-05-15 DIAGNOSIS — M19.042 OSTEOARTHRITIS OF BOTH HANDS, UNSPECIFIED OSTEOARTHRITIS TYPE: ICD-10-CM

## 2023-05-15 DIAGNOSIS — M15.1 HEBERDEN'S NODES (WITH ARTHROPATHY): ICD-10-CM

## 2023-05-15 DIAGNOSIS — E03.9 HYPOTHYROIDISM, UNSPECIFIED TYPE: ICD-10-CM

## 2023-05-15 DIAGNOSIS — Z71.3 WEIGHT LOSS COUNSELING, ENCOUNTER FOR: Primary | ICD-10-CM

## 2023-05-15 DIAGNOSIS — M19.041 OSTEOARTHRITIS OF BOTH HANDS, UNSPECIFIED OSTEOARTHRITIS TYPE: ICD-10-CM

## 2023-05-15 PROCEDURE — 99213 OFFICE O/P EST LOW 20 MIN: CPT | Performed by: NURSE PRACTITIONER

## 2023-05-15 PROCEDURE — 3078F DIAST BP <80 MM HG: CPT | Performed by: NURSE PRACTITIONER

## 2023-05-15 PROCEDURE — 3074F SYST BP LT 130 MM HG: CPT | Performed by: NURSE PRACTITIONER

## 2023-05-15 RX ORDER — LEVOTHYROXINE SODIUM 0.05 MG/1
TABLET ORAL
Qty: 90 TABLET | Refills: 1 | Status: SHIPPED | OUTPATIENT
Start: 2023-05-15

## 2023-05-15 RX ORDER — SEMAGLUTIDE 2.4 MG/.75ML
2.4 INJECTION, SOLUTION SUBCUTANEOUS
Qty: 3 ML | Refills: 2 | Status: SHIPPED | OUTPATIENT
Start: 2023-05-15

## 2023-05-15 SDOH — ECONOMIC STABILITY: FOOD INSECURITY: WITHIN THE PAST 12 MONTHS, THE FOOD YOU BOUGHT JUST DIDN'T LAST AND YOU DIDN'T HAVE MONEY TO GET MORE.: NEVER TRUE

## 2023-05-15 SDOH — ECONOMIC STABILITY: FOOD INSECURITY: WITHIN THE PAST 12 MONTHS, YOU WORRIED THAT YOUR FOOD WOULD RUN OUT BEFORE YOU GOT MONEY TO BUY MORE.: NEVER TRUE

## 2023-05-15 SDOH — ECONOMIC STABILITY: HOUSING INSECURITY
IN THE LAST 12 MONTHS, WAS THERE A TIME WHEN YOU DID NOT HAVE A STEADY PLACE TO SLEEP OR SLEPT IN A SHELTER (INCLUDING NOW)?: NO

## 2023-05-15 SDOH — ECONOMIC STABILITY: INCOME INSECURITY: HOW HARD IS IT FOR YOU TO PAY FOR THE VERY BASICS LIKE FOOD, HOUSING, MEDICAL CARE, AND HEATING?: NOT HARD AT ALL

## 2023-05-15 ASSESSMENT — ENCOUNTER SYMPTOMS
SHORTNESS OF BREATH: 0
WHEEZING: 0
ABDOMINAL DISTENTION: 0
BACK PAIN: 0
CHEST TIGHTNESS: 0
COUGH: 0
ABDOMINAL PAIN: 0

## 2023-05-15 NOTE — TELEPHONE ENCOUNTER
Alberto Boss needs refill of   Requested Prescriptions     Pending Prescriptions Disp Refills    levothyroxine (SYNTHROID) 50 MCG tablet [Pharmacy Med Name: LEVOTHYROXINE 50 MCG TABLET] 90 tablet 1     Sig: TAKE 1 TABLET BY MOUTH EVERY DAY       Last Filled on:      Last Visit Date:  10/20/2022    Next Visit Date:  Visit date not found

## 2023-05-15 NOTE — PROGRESS NOTES
[facility]  [address] [facility] [address]  Dept: [phone]  Dept Fax: [phone]  Loc: [phone]  PROGRESS NOTE      VisitDate: 5/15/2023    Zenaida Oakley is a 65 y.o. female who presents today for:     Chief Complaint   Patient presents with    Weight Management     Wegovy checkup, has had no side effects    Discuss Medications     Stopped hydrochlorothiazide 1wk ago, dry mouth, denies edema or sob         Subjective:  Patient presents for weight loss management/Wegovy therapy. Denies any side effects. Roughly 4 to 5 pound weight loss over the past month. Patient has made dietary changes as well as implemented walking regimen. Patient did stop her hydrochlorothiazide approximately a week ago due to dry mouth. Taking other medications as prescribed. History of obesity hypertension basal cell carcinoma. Review of Systems   Constitutional:  Negative for activity change, appetite change, chills, fatigue and fever. Eyes:  Negative for visual disturbance. Respiratory:  Negative for cough, chest tightness, shortness of breath and wheezing. Cardiovascular:  Negative for chest pain, palpitations and leg swelling. Gastrointestinal:  Negative for abdominal distention and abdominal pain. Genitourinary:  Negative for dysuria. Musculoskeletal:  Negative for arthralgias, back pain and neck pain. Skin: Negative. Negative for rash. Neurological:  Negative for dizziness, light-headedness and headaches. Hematological:  Negative for adenopathy. Psychiatric/Behavioral:  Negative for decreased concentration and dysphoric mood. All other systems reviewed and are negative.   Past Medical History:   Diagnosis Date    Carcinoma, basal cell, skin     Hypertension       Past Surgical History:   Procedure Laterality Date    BACK SURGERY      3/8/22, [facility]oNeuro     SECTION      x3    SKIN BIOPSY      basal cell

## 2023-05-22 DIAGNOSIS — I10 HYPERTENSION, UNSPECIFIED TYPE: ICD-10-CM

## 2023-05-22 RX ORDER — LOSARTAN POTASSIUM 50 MG/1
TABLET ORAL
Qty: 90 TABLET | Refills: 1 | Status: SHIPPED | OUTPATIENT
Start: 2023-05-22

## 2023-05-22 RX ORDER — HYDROCHLOROTHIAZIDE 12.5 MG/1
CAPSULE, GELATIN COATED ORAL
Qty: 90 CAPSULE | Refills: 1 | Status: SHIPPED | OUTPATIENT
Start: 2023-05-22

## 2023-06-01 ENCOUNTER — PATIENT MESSAGE (OUTPATIENT)
Dept: FAMILY MEDICINE CLINIC | Age: 63
End: 2023-06-01

## 2023-06-02 RX ORDER — SEMAGLUTIDE 2.4 MG/.75ML
2.4 INJECTION, SOLUTION SUBCUTANEOUS
Qty: 3 ML | Refills: 2 | OUTPATIENT
Start: 2023-06-02

## 2023-06-02 RX ORDER — SEMAGLUTIDE 2.4 MG/.75ML
2.4 INJECTION, SOLUTION SUBCUTANEOUS
Qty: 3 ML | Refills: 2 | Status: SHIPPED | OUTPATIENT
Start: 2023-06-02

## 2023-07-17 ENCOUNTER — OFFICE VISIT (OUTPATIENT)
Dept: FAMILY MEDICINE CLINIC | Age: 63
End: 2023-07-17
Payer: COMMERCIAL

## 2023-07-17 VITALS
HEART RATE: 76 BPM | OXYGEN SATURATION: 99 % | RESPIRATION RATE: 16 BRPM | BODY MASS INDEX: 25.58 KG/M2 | TEMPERATURE: 97.8 F | WEIGHT: 149 LBS | SYSTOLIC BLOOD PRESSURE: 128 MMHG | DIASTOLIC BLOOD PRESSURE: 68 MMHG

## 2023-07-17 DIAGNOSIS — Z71.3 WEIGHT LOSS COUNSELING, ENCOUNTER FOR: Primary | ICD-10-CM

## 2023-07-17 PROCEDURE — 99213 OFFICE O/P EST LOW 20 MIN: CPT | Performed by: NURSE PRACTITIONER

## 2023-07-17 RX ORDER — SEMAGLUTIDE 2.4 MG/.75ML
2.4 INJECTION, SOLUTION SUBCUTANEOUS
Qty: 3 ML | Refills: 2 | Status: SHIPPED | OUTPATIENT
Start: 2023-07-17

## 2023-07-17 ASSESSMENT — ENCOUNTER SYMPTOMS
SHORTNESS OF BREATH: 0
ABDOMINAL PAIN: 0
ABDOMINAL DISTENTION: 0
BACK PAIN: 0
CHEST TIGHTNESS: 0
WHEEZING: 0
CONSTIPATION: 1
COUGH: 0

## 2023-07-17 NOTE — PROGRESS NOTES
[address] [place] [address]  Dept: [phone]  Dept Fax: [phone]  Loc: [phone]  PROGRESS NOTE      VisitDate: 2023    Jorge Gan is a 65 y.o. female who presents today for:     Chief Complaint   Patient presents with    Weight Management     Wegovy 2mo fu, tolerating well-no side effects         Subjective:  2-month follow-up regarding Wegovy therapy weight loss therapy. Patient reports doing well denies any nausea vomiting abdominal pain . Reports mild constipation relieved with increased dietary fiber. 6 pound weight weight loss past month. Review of Systems   Constitutional:  Negative for activity change, appetite change, chills, fatigue and fever. Eyes:  Negative for visual disturbance. Respiratory:  Negative for cough, chest tightness, shortness of breath and wheezing. Cardiovascular:  Negative for chest pain, palpitations and leg swelling. Gastrointestinal:  Positive for constipation. Negative for abdominal distention and abdominal pain. Genitourinary:  Negative for dysuria. Musculoskeletal:  Negative for arthralgias, back pain and neck pain. Skin: Negative. Negative for rash. Neurological:  Negative for dizziness, light-headedness and headaches. Hematological:  Negative for adenopathy. Psychiatric/Behavioral:  Negative for decreased concentration and dysphoric mood. All other systems reviewed and are negative.   Past Medical History:   Diagnosis Date    Carcinoma, basal cell, skin     Hypertension       Past Surgical History:   Procedure Laterality Date    BACK SURGERY      3/8/22, OrthoNeuro     SECTION      x3    SKIN BIOPSY      basal cell     WISDOM TOOTH EXTRACTION       Family History   Problem Relation Age of Onset    High Blood Pressure Mother     Celiac Disease Mother     High Blood Pressure Father     Heart Attack Father     Arthritis Brother     Breast

## 2023-09-20 DIAGNOSIS — E03.9 HYPOTHYROIDISM, UNSPECIFIED TYPE: ICD-10-CM

## 2023-09-20 RX ORDER — LEVOTHYROXINE SODIUM 0.05 MG/1
TABLET ORAL
Qty: 90 TABLET | Refills: 1 | Status: SHIPPED | OUTPATIENT
Start: 2023-09-20

## 2023-10-23 ENCOUNTER — OFFICE VISIT (OUTPATIENT)
Dept: FAMILY MEDICINE CLINIC | Age: 63
End: 2023-10-23
Payer: COMMERCIAL

## 2023-10-23 VITALS
SYSTOLIC BLOOD PRESSURE: 126 MMHG | RESPIRATION RATE: 16 BRPM | WEIGHT: 147 LBS | BODY MASS INDEX: 25.23 KG/M2 | HEART RATE: 80 BPM | DIASTOLIC BLOOD PRESSURE: 74 MMHG | OXYGEN SATURATION: 90 % | TEMPERATURE: 98 F

## 2023-10-23 DIAGNOSIS — E03.9 HYPOTHYROIDISM, UNSPECIFIED TYPE: ICD-10-CM

## 2023-10-23 DIAGNOSIS — Z13.220 SCREENING CHOLESTEROL LEVEL: ICD-10-CM

## 2023-10-23 DIAGNOSIS — M15.1 HEBERDEN'S NODES (WITH ARTHROPATHY): ICD-10-CM

## 2023-10-23 DIAGNOSIS — M19.041 OSTEOARTHRITIS OF BOTH HANDS, UNSPECIFIED OSTEOARTHRITIS TYPE: ICD-10-CM

## 2023-10-23 DIAGNOSIS — M19.049 ARTHRITIS PAIN, HAND: ICD-10-CM

## 2023-10-23 DIAGNOSIS — I10 HYPERTENSION, UNSPECIFIED TYPE: ICD-10-CM

## 2023-10-23 DIAGNOSIS — M19.042 OSTEOARTHRITIS OF BOTH HANDS, UNSPECIFIED OSTEOARTHRITIS TYPE: ICD-10-CM

## 2023-10-23 DIAGNOSIS — E66.3 OVERWEIGHT WITH BODY MASS INDEX (BMI) OF 27 TO 27.9 IN ADULT: ICD-10-CM

## 2023-10-23 DIAGNOSIS — Z71.3 WEIGHT LOSS COUNSELING, ENCOUNTER FOR: Primary | ICD-10-CM

## 2023-10-23 PROCEDURE — 90471 IMMUNIZATION ADMIN: CPT | Performed by: NURSE PRACTITIONER

## 2023-10-23 PROCEDURE — 90674 CCIIV4 VAC NO PRSV 0.5 ML IM: CPT | Performed by: NURSE PRACTITIONER

## 2023-10-23 PROCEDURE — 99213 OFFICE O/P EST LOW 20 MIN: CPT | Performed by: NURSE PRACTITIONER

## 2023-10-23 PROCEDURE — 3078F DIAST BP <80 MM HG: CPT | Performed by: NURSE PRACTITIONER

## 2023-10-23 PROCEDURE — 3074F SYST BP LT 130 MM HG: CPT | Performed by: NURSE PRACTITIONER

## 2023-10-23 RX ORDER — SEMAGLUTIDE 2.4 MG/.75ML
2.4 INJECTION, SOLUTION SUBCUTANEOUS
Qty: 3 ML | Refills: 2 | Status: SHIPPED | OUTPATIENT
Start: 2023-10-23

## 2023-10-23 RX ORDER — NIACINAMIDE 500 MG
TABLET ORAL
Qty: 180 TABLET | Refills: 1 | Status: SHIPPED | OUTPATIENT
Start: 2023-10-23

## 2023-10-23 RX ORDER — LOSARTAN POTASSIUM 50 MG/1
50 TABLET ORAL DAILY
Qty: 90 TABLET | Refills: 1 | Status: SHIPPED | OUTPATIENT
Start: 2023-10-23

## 2023-10-23 NOTE — PROGRESS NOTES
Immunizations Administered       Name Date Dose Route    Influenza, FLUCELVAX, (age 10 mo+), MDCK, PF, 0.5mL 10/23/2023 0.5 mL Intramuscular    Site: Deltoid- Left    Lot: [id number]    [address]: 92664-410-02

## 2023-10-24 ASSESSMENT — ENCOUNTER SYMPTOMS
COUGH: 0
ABDOMINAL PAIN: 0
CHEST TIGHTNESS: 0
BACK PAIN: 0
WHEEZING: 0
SHORTNESS OF BREATH: 0
ABDOMINAL DISTENTION: 0

## 2023-10-24 NOTE — PROGRESS NOTES
[address] [facility]  [address] [facility] [address]  Dept: [phone]  Dept Fax: [phone]  Loc: [phone]  PROGRESS NOTE      VisitDate: 10/23/2023    Jorge Gan is a 59 y.o. female who presents today for:     Chief Complaint   Patient presents with    3 Month Follow-Up     HTN, wegovy fu-tolerating well, flu shot         Subjective:  3-month follow-up regarding semaglutide therapy Wegovy therapy. Also follow-up on hypertension. Patient tolerating medication well. Here to update influenza vaccination. Denies any dizziness headaches syncope chest pain shortness of breath. Denies any nausea vomiting constipation or abdominal pain. Patient reports a weight loss of only a couple pounds. Weight stable. Requesting consult with hand specialist due to hand pain and nodular deformities. Review of Systems   Constitutional:  Negative for activity change, appetite change, chills, fatigue and fever. Eyes:  Negative for visual disturbance. Respiratory:  Negative for cough, chest tightness, shortness of breath and wheezing. Cardiovascular:  Negative for chest pain, palpitations and leg swelling. Gastrointestinal:  Negative for abdominal distention and abdominal pain. Genitourinary:  Negative for dysuria. Musculoskeletal:  Positive for arthralgias. Negative for back pain and neck pain. Skin: Negative. Negative for rash. Neurological:  Negative for dizziness, light-headedness and headaches. Hematological:  Negative for adenopathy. Psychiatric/Behavioral:  Negative for decreased concentration and dysphoric mood. All other systems reviewed and are negative.     Past Medical History:   Diagnosis Date    Carcinoma, basal cell, skin     Hypertension       Past Surgical History:   Procedure Laterality Date    BACK SURGERY      3/8/22, OrthoNeuro     SECTION      x3    SKIN BIOPSY      basal cell     WISDOM TOOTH

## 2024-02-12 DIAGNOSIS — E03.9 HYPOTHYROIDISM, UNSPECIFIED TYPE: ICD-10-CM

## 2024-02-12 RX ORDER — LEVOTHYROXINE SODIUM 0.05 MG/1
TABLET ORAL
Qty: 90 TABLET | Refills: 1 | Status: SHIPPED | OUTPATIENT
Start: 2024-02-12

## 2024-02-27 ENCOUNTER — OFFICE VISIT (OUTPATIENT)
Dept: FAMILY MEDICINE CLINIC | Age: 64
End: 2024-02-27
Payer: COMMERCIAL

## 2024-02-27 VITALS
WEIGHT: 143 LBS | OXYGEN SATURATION: 98 % | DIASTOLIC BLOOD PRESSURE: 86 MMHG | BODY MASS INDEX: 24.55 KG/M2 | HEART RATE: 89 BPM | SYSTOLIC BLOOD PRESSURE: 130 MMHG | TEMPERATURE: 98 F | RESPIRATION RATE: 16 BRPM

## 2024-02-27 DIAGNOSIS — H10.9 CONJUNCTIVITIS OF RIGHT EYE, UNSPECIFIED CONJUNCTIVITIS TYPE: Primary | ICD-10-CM

## 2024-02-27 DIAGNOSIS — S61.451A DOG BITE OF RIGHT HAND, INITIAL ENCOUNTER: ICD-10-CM
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
## 2024-02-27 PROCEDURE — 99213 OFFICE O/P EST LOW 20 MIN: CPT | Performed by: NURSE PRACTITIONER

## 2024-02-27 RX ORDER — POLYMYXIN B SULFATE AND TRIMETHOPRIM 1; 10000 MG/ML; [USP'U]/ML
1 SOLUTION OPHTHALMIC EVERY 4 HOURS
Qty: 3 ML | Refills: 0 | Status: SHIPPED | OUTPATIENT
Start: 2024-02-27 | End: 2024-03-08

## 2024-02-27 RX ORDER — SULFAMETHOXAZOLE AND TRIMETHOPRIM 800; 160 MG/1; MG/1
1 TABLET ORAL 2 TIMES DAILY
Qty: 20 TABLET | Refills: 0 | Status: SHIPPED | OUTPATIENT
Start: 2024-02-27 | End: 2024-03-08

## 2024-02-27 RX ORDER — CLINDAMYCIN HYDROCHLORIDE 300 MG/1
300 CAPSULE ORAL 2 TIMES DAILY
Qty: 14 CAPSULE | Refills: 0 | Status: SHIPPED | OUTPATIENT
Start: 2024-02-27 | End: 2024-03-05

## 2024-02-27 ASSESSMENT — PATIENT HEALTH QUESTIONNAIRE - PHQ9
2. FEELING DOWN, DEPRESSED OR HOPELESS: 0
SUM OF ALL RESPONSES TO PHQ9 QUESTIONS 1 & 2: 0
SUM OF ALL RESPONSES TO PHQ QUESTIONS 1-9: 0
1. LITTLE INTEREST OR PLEASURE IN DOING THINGS: 0
SUM OF ALL RESPONSES TO PHQ QUESTIONS 1-9: 0

## 2024-02-27 ASSESSMENT — ENCOUNTER SYMPTOMS
ABDOMINAL DISTENTION: 0
EYE REDNESS: 1
ABDOMINAL PAIN: 0
BACK PAIN: 0
WHEEZING: 0
CHEST TIGHTNESS: 0
EYE DISCHARGE: 1
SHORTNESS OF BREATH: 0
COUGH: 0

## 2024-02-27 NOTE — PATIENT INSTRUCTIONS
Medicare Annual Wellness Visit  Name: Josse Hodge Date: 2021   MRN: <[id number]> Sex: Male   Age: 77 y.o. Ethnicity: Non-/Non    : 1951 Race: Hollie Lewis is here for Medicare AWV    Screenings for behavioral, psychosocial and functional/safety risks, and cognitive dysfunction are all negative except as indicated below. These results, as well as other patient data from the [address] [facility] form, are documented in Flowsheets linked to this Encounter. Allergies   Allergen Reactions    Cleocin [Clindamycin Hcl]     Codeine     Eucerin [Albolene]     Glucophage [Metformin Hcl]     Januvia [Sitagliptin]     Lisinopril     Zithromax [Azithromycin]     Ciprofloxacin Hcl Swelling and Rash    Keflex [Cephalexin] Swelling and Rash         Prior to Visit Medications    Medication Sig Taking? Authorizing Provider   OZEMPIC, 1 MG/DOSE, 4 MG/3ML SOPN INJECT 1MG SUBCUTANEOUSLY EVERY WEEK Yes Historical Provider, MD   metOLazone (ZAROXOLYN) 2.5 MG tablet Take 1 tablet by mouth three times a week Yes Meeta Marquez DO   LORazepam (ATIVAN) 1 MG tablet lorazepam 1 mg tablet   TAKE 1 TABLET BY MOUTH TWICE DAILY Yes Malu Luciano, DO   HYDROcodone-acetaminophen (NORCO) 7.5-325 MG per tablet Take 1 tablet by mouth every 6 hours as needed for Pain for up to 30 days. Intended supply: 30 days Yes Meeta Marquez DO   Continuous Blood Gluc  (FREESTYLE MEDHAT 2 READER) GERALD Use device to check BS 3-4x/day and PRN for abnormal BS Yes Meeta Marquez DO   HYDROcodone-acetaminophen (NORCO) 7.5-325 MG per tablet Take 1 tablet by mouth every 6 hours as needed for Pain for up to 30 days. Intended supply: 30 days Yes Meeta Marquez DO   HYDROcodone-acetaminophen (NORCO) 7.5-325 MG per tablet Take 1 tablet by mouth every 6 hours as needed for Pain for up to 30 days.  Intended supply: 30 days Yes Meeta Marquez DO   losartan (COZAAR) 50 MG tablet Take 1 tablet by Recommend probiotic daily or yogurt daily while on antibiotics   mouth daily Yes Meeta Marquez DO   fluticasone (FLONASE) 50 MCG/ACT nasal spray 1 spray by Each Nostril route daily Yes Meeta Marquez DO   albuterol sulfate  (90 Base) MCG/ACT inhaler Inhale 2 puffs into the lungs 4 times daily as needed (2 puffs) Yes Meeta Marquez DO   Continuous Blood Gluc Sensor (FREESTYLE MEDHAT 2 SENSOR) [facility] [place] device to check BS 3-4x/day and PRN for abnormal BS Yes Meeta Marquez DO   Continuous Blood Gluc Sensor (FREESTYLE MEDHAT 2 SENSOR) MISC Use as directed to check blood sugar 4x/day and PRN Yes Meeta Marquez DO   Insulin Pen Needle (PEN NEEDLES) 31G X 5 MM MISC 1 each by Does not apply route 2 times daily Yes Meeta Marquez DO   bumetanide (BUMEX) 1 MG tablet Take 1 mg by mouth 2 times daily Yes Historical Provider, MD   Insulin Glargine, 2 Unit Dial, (TOUJEO MAX SOLOSTAR) 300 UNIT/ML SOPN Toujeo Max U-300 SoloStar 300 unit/mL (3 mL) subcutaneous insulin pen   INJECT A MAX OF 50 UNITS UNDER THE SKIN DAILY Yes Historical Provider, MD   atorvastatin (LIPITOR) 20 MG tablet Take 20 mg by mouth daily Yes Historical Provider, MD   metoprolol tartrate (LOPRESSOR) 50 MG tablet Take 50 mg by mouth 2 times daily Yes Historical Provider, MD   glimepiride (AMARYL) 4 MG tablet Take 4 mg by mouth every morning (before breakfast) Yes Historical Provider, MD   insulin lispro, 1 Unit Dial, (HUMALOG KWIKPEN) 100 UNIT/ML SOPN Inject 5 Units into the skin 3 times daily Yes Historical Provider, MD   JARDIANCE 25 MG tablet Take 25 mg by mouth Daily Yes Historical Provider, MD   Ferrous Sulfate (IRON) 325 (65 Fe) MG TABS Take 325 mg by mouth every other day Yes Meeta Marquez DO   potassium chloride (KLOR-CON M) 20 MEQ TBCR extended release tablet TK 1 T PO BID Yes Meeta Marquez DO   Ascorbic Acid (VITAMIN C) 250 MG tablet Take 250 mg by mouth daily Yes Historical Provider, MD   metOLazone (ZAROXOLYN) 2.5 MG tablet Take 1 tablet by mouth three times a week  Vic Michel, DO Past Medical History:   Diagnosis Date    DDD (degenerative disc disease), cervical     DDD (degenerative disc disease), thoracic     Diabetes mellitus ([address]     Follicular adenocarcinoma, moderately differentiated ([address]     rectum     Hyperlipidemia     Hypertension     Obesity     morbid     NICOLLE (obstructive sleep apnea)     Peripheral edema     Stasis dermatitis     Thyroid nodule     Vitamin D insufficiency        Past Surgical History:   Procedure Laterality Date    CERVICAL FUSION      COLONOSCOPY      EYE SURGERY      cataract with lol implants - OU    HERNIA REPAIR      umbilical     RECTAL SURGERY      transanal excision rectal cancer     SIGMOIDOSCOPY      procotoscopy     UPPER GASTROINTESTINAL ENDOSCOPY  06/15/2020    Radha- erosive gastritis         Family History   Problem Relation Age of Onset    Cancer Mother         breast     Diabetes Father     Diabetes Brother     Heart Attack Brother     Other Brother         valvular heart disease    Cancer Brother         lung        CareTeam (Including outside providers/suppliers regularly involved in providing care):   Patient Care Team:  Berlin Bautista DO as PCP - General (Family Medicine)  Berlin Bautista DO as PCP - [facility] Empaneled Provider    Wt Readings from Last 3 Encounters:   05/18/21 (!) 374 lb (169.6 kg)   08/26/20 (!) 374 lb 3.2 oz (169.7 kg)   05/28/20 (!) 385 lb (174.6 kg)     Patient-Reported Vitals 7/6/2021   Patient-Reported Weight 359lb   Patient-Reported Systolic 665   Patient-Reported Diastolic 70   Patient-Reported Pulse 82   Patient-Reported Temperature -   Patient-Reported SpO2 93      There is no height or weight on file to calculate BMI. Based upon direct observation of the patient, evaluation of cognition reveals recent and remote memory intact. Physical Exam  Vitals and nursing note reviewed. Constitutional:       General: He is not in acute distress. Appearance: Normal appearance.  He is well-developed. He is morbidly obese. He is not ill-appearing. HENT:      Head: Normocephalic and atraumatic. Right Ear: Hearing and external ear normal.      Left Ear: Hearing and external ear normal.      Nose: Nose normal.   Eyes:      General: Lids are normal. No scleral icterus. Extraocular Movements: Extraocular movements intact. Conjunctiva/sclera: Conjunctivae normal.   Pulmonary:      Effort: Pulmonary effort is normal. No respiratory distress. Breath sounds: No wheezing. Musculoskeletal:         General: Normal range of motion. Cervical back: Normal range of motion. Skin:     Findings: No rash. Neurological:      Mental Status: He is alert and oriented to person, place, and time. Psychiatric:         Mood and Affect: Mood and affect normal.         Speech: Speech normal.         Behavior: Behavior normal.         Thought Content: Thought content normal.         Patient's complete Health Risk Assessment and screening values have been reviewed and are found in Flowsheets. The following problems were reviewed today and where indicated follow up appointments were made and/or referrals ordered. Positive Risk Factor Screenings with Interventions:            General Health and ACP:  General  In general, how would you say your health is?: Good  In the past 7 days, have you experienced any of the following?  New or Increased Pain, New or Increased Fatigue, Loneliness, Social Isolation, Stress or Anger?: (!) Anger  Do you get the social and emotional support that you need?: Yes  Do you have a Living Will?: Yes  Advance Directives     Power of  Living Will ACP-Advance Directive ACP-Power of     Not on File Not on File Not on File Not on File      General Health Risk Interventions:  · Anger: patient declines any further evaluation/treatment for this issue    Health Habits/Nutrition:  Health Habits/Nutrition  Do you exercise for at least 20 minutes 2-3 times per week?: Yes  Have you lost any weight without trying in the past 3 months?: No  Do you eat only one meal per day?: No  Have you seen the dentist within the past year?: (!) No     Health Habits/Nutrition Interventions:  · Dental exam overdue:  patient encouraged to make appointment with his/her dentist    Hearing/Vision:  No exam data present  Hearing/Vision  Do you or your family notice any trouble with your hearing that hasn't been managed with hearing aids?: (!) Yes  Do you have difficulty driving, watching TV, or doing any of your daily activities because of your eyesight?: No  Have you had an eye exam within the past year?: (!) No  Hearing/Vision Interventions:  · Hearing concerns:  patient declines any further evaluation/treatment for hearing issues  · Vision concerns:  patient encouraged to make appointment with his/her eye specialist    Safety:  Safety  Do you have working smoke detectors?: (!) No  Have all throw rugs been removed or fastened?: Yes  Do you have non-slip mats or surfaces in all bathtubs/showers?: Yes  Do all of your stairways have a railing or banister?: Yes  Are your doorways, halls and stairs free of clutter?: Yes  Do you always fasten your seatbelt when you are in a car?: (!) No  Safety Interventions:  · Home safety tips provided    ADL:  ADLs  In the past 7 days, did you need help from others to perform any of the following everyday activities? Eating, dressing, grooming, bathing, toileting, or walking/balance?: (!) Bathing, Walking/Balance, Grooming  In the past 7 days, did you need help from others to take care of any of the following?  Laundry, housekeeping, banking/finances, shopping, telephone use, food preparation, transportation, or taking medications?: (!) Taking Medications, Shopping, Banking/Finances, Housekeeping, Food Preparation, Transportation  ADL Interventions:  · Patient asked for walk in tub- discussed finding out from insurance where covered      Personalized Preventive Plan   Current Health Maintenance Status  Immunization History   Administered Date(s) Administered    Influenza Virus Vaccine 12/08/2010, 09/17/2012, 10/22/2013, 10/17/2014, 09/14/2015, 11/16/2016, 11/16/2017    Influenza, High Dose (Fluzone 65 yrs and older) 01/18/2019    Influenza, Triv, inactivated, subunit, adjuvanted, IM (Fluad 65 yrs and older) 11/25/2019    Pneumococcal Conjugate 13-valent (Laura Lis) 01/14/2015, 01/18/2019    Pneumococcal Polysaccharide ([id number]) 10/22/2013, 08/26/2020        Health Maintenance   Topic Date Due    COVID-19 Vaccine (1) Never done    Colon cancer screen colonoscopy  06/26/2018    Annual Wellness Visit (AWV)  Never done    A1C test (Diabetic or Prediabetic)  06/16/2021    DTaP/Tdap/Td vaccine (1 - Tdap) 07/27/2021 (Originally 9/14/1970)    Diabetic foot exam  08/26/2021 (Originally 11/13/2019)    Diabetic retinal exam  01/02/2022 (Originally 10/11/2019)    Shingles Vaccine (1 of 2) 07/06/2022 (Originally 9/14/2002)    Flu vaccine (1) 09/01/2021    Diabetic microalbuminuria test  03/16/2022    Lipid screen  03/16/2022    Potassium monitoring  03/16/2022    Creatinine monitoring  03/16/2022    Pneumococcal 65+ years Vaccine  Completed    Hepatitis A vaccine  Aged Out    Hib vaccine  Aged Out    Meningococcal (ACWY) vaccine  Aged Out    Hepatitis C screen  Discontinued     Recommendations for [employer] Due: see orders and patient instructions/AVS.    Recommended screening schedule for the next 5-10 years is provided to the patient in written form: see Patient Instructions/AVS.      Assessment and Plan  Levon Dove was seen today for medicare awv. Diagnoses and all orders for this visit:    Routine general medical examination at a health care facility  HRA reviewed and addressed. Was due to have colonoscopy in May, but wife had medical issues and had to cancel. Planning to reschedule. Otherwise UTD on HM. Due for fasting labs at this time. Lumbar disc disease with radiculopathy  -     HYDROcodone-acetaminophen (NORCO) 7.5-325 MG per tablet; Take 1 tablet by mouth every 6 hours as needed for Pain for up to 30 days. Intended supply: 30 days  -     HYDROcodone-acetaminophen (NORCO) 7.5-325 MG per tablet; Take 1 tablet by mouth every 6 hours as needed for Pain for up to 30 days. Intended supply: 30 days  -     HYDROcodone-acetaminophen (NORCO) 7.5-325 MG per tablet; Take 1 tablet by mouth every 6 hours as needed for Pain for up to 30 days. Intended supply: 30 days  OARRS reviewed and appropriate    Type 2 diabetes mellitus with hyperglycemia, without long-term current use of insulin (HCC)  -     Continuous Blood Gluc  (FREESTYLE MEDHAT 2 READER) GERALD; Use device to check BS 3-4x/day and PRN for abnormal BS  -     Continuous Blood Gluc Sensor (FREESTYLE MEDHAT 2 SENSOR) MISC; Use device to check BS 3-4x/day and PRN for abnormal BS  -     CBC Auto Differential; Future  -     Comprehensive Metabolic Panel; Future  -     Hemoglobin A1C; Future  -     Lipid Panel; Future  -     TSH without Reflex; Future  -     Vitamin B12 & Folate; Future  -     Urinalysis; Future  -     Uric Acid; Future  -     Microalbumin / Creatinine Urine Ratio; Future  Medications changed by Dr. Wili Cabrera. BS now running between 100-200. Due for repeat labs. Chronic rhinitis  -     fluticasone (FLONASE) 50 MCG/ACT nasal spray; 1 spray by Each Nostril route daily    Benign essential hypertension  -     losartan (COZAAR) 50 MG tablet; Take 1 tablet by mouth daily  Well controlled at home    Chronic congestive heart failure with left ventricular diastolic dysfunction (HCC)  Stable    Chronic systolic congestive heart failure (HCC)  -     metOLazone (ZAROXOLYN) 2.5 MG tablet; Take 1 tablet by mouth three times a week  -     albuterol sulfate  (90 Base) MCG/ACT inhaler;  Inhale 2 puffs into the lungs 4 times daily as needed (2 puffs)    Anxiety disorder due to medical condition  -     LORazepam (ATIVAN) 1 MG tablet; lorazepam 1 mg tablet   TAKE 1 TABLET BY MOUTH TWICE DAILY    Vitamin D insufficiency  -     Vitamin D 25 Hydroxy; Future          Return in about 4 months (around 11/6/2021) for Chronic pain medication refills. Seen By:  Brayan Díaz DO        Kellen Srivastava is a 77 y.o. male being evaluated by a Virtual Visit (video and audio) encounter to address concerns as mentioned above. A caregiver was present when appropriate. Due to this being a TeleHealth encounter (During YFZ-[facility] public [facility] emergency), evaluation of the following organ systems was limited: Vitals/Constitutional/EENT/Resp/CV/GI//MS/Neuro/Skin/Heme-Lymph-Imm. Pursuant to the emergency declaration under the [address] [place] [address], [address] [place] [address] authority and the [employer] Act, this Virtual Visit was conducted with patient's (and/or legal guardian's) consent, to reduce the patient's risk of exposure to COVID-19 and provide necessary medical care. The patient (and/or legal guardian) has also been advised to contact this office for worsening conditions or problems, and seek emergency medical treatment and/or call 911 if deemed necessary. Patient identification was verified at the start of the visit: Yes    Services were provided through a video synchronous discussion virtually to substitute for in-person clinic visit. Patient and provider were located at their individual homes. --Brayan Díaz DO on 7/6/2021 at 2:13 PM    An electronic signature was used to authenticate this note.

## 2024-02-27 NOTE — PROGRESS NOTES
[facility] PROFESSIONAL SERVS  [facility]  [address] [address]  Dept: [phone]  Dept Fax: [phone]  Loc: [phone]  PROGRESS NOTE      VisitDate: 2024    Loretta Chavez is a 63 y.o. female who presents today for:     Chief Complaint   Patient presents with    Conjunctivitis     Since this morning she has right eye redness and drainage.    Animal Bite     Right hand dog bite this morning from a neighbors dog.         Subjective:  Patient Zentz with complaint of right eye redness and drainage today.  Also complains of a dog bite to right hand she sustained earlier this a.m. while walking.  History of basal cell skin cancer, hypertension.          Review of Systems   Constitutional:  Negative for activity change, appetite change, chills, fatigue and fever.   Eyes:  Positive for discharge and redness. Negative for visual disturbance.   Respiratory:  Negative for cough, chest tightness, shortness of breath and wheezing.    Cardiovascular:  Negative for chest pain, palpitations and leg swelling.   Gastrointestinal:  Negative for abdominal distention and abdominal pain.   Genitourinary:  Negative for dysuria.   Musculoskeletal:  Negative for arthralgias, back pain and neck pain.   Skin:  Positive for wound. Negative for rash.   Neurological:  Negative for dizziness, light-headedness and headaches.   Hematological:  Negative for adenopathy.   Psychiatric/Behavioral:  Negative for decreased concentration and dysphoric mood.    All other systems reviewed and are negative.    Past Medical History:   Diagnosis Date    Carcinoma, basal cell, skin     Hypertension       Past Surgical History:   Procedure Laterality Date    BACK SURGERY      3/8/22, OrthoNeuro     SECTION      x3    SKIN BIOPSY      basal cell     WISDOM TOOTH EXTRACTION       Family History   Problem Relation Age of Onset    High Blood Pressure Mother     Celiac Disease Mother     High

## 2024-03-08 ENCOUNTER — TELEPHONE (OUTPATIENT)
Dept: FAMILY MEDICINE CLINIC | Age: 64
End: 2024-03-08

## 2024-03-08 DIAGNOSIS — S61.451A DOG BITE OF RIGHT HAND, INITIAL ENCOUNTER: Primary | ICD-10-CM

## 2024-03-08 DIAGNOSIS — S61.452A DOG BITE OF LEFT HAND, INITIAL ENCOUNTER: ICD-10-CM
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
## 2024-03-08 RX ORDER — CLINDAMYCIN HYDROCHLORIDE 300 MG/1
300 CAPSULE ORAL 3 TIMES DAILY
Qty: 30 CAPSULE | Refills: 0 | Status: SHIPPED | OUTPATIENT
Start: 2024-03-08 | End: 2024-03-18

## 2024-03-08 NOTE — TELEPHONE ENCOUNTER
Since the weekend is coming up, I sent a prescription for clindamycin.  It is okay for her to wait a few days to see how it goes as empiric treatment of dog bites is not quite as mandatory as it is with cat bites.  She has had a lot of antibiotics in we want to be careful about too much if not needed.

## 2024-03-08 NOTE — TELEPHONE ENCOUNTER
Loretta was notified and will just use Bactroban unless it becomes red, hot swollen or she develops fever. Then she will start the oral antibiotic.

## 2024-03-08 NOTE — TELEPHONE ENCOUNTER
She was in to see Mulugeta for dog bite on right hand by neighbors dog. He treated her with bactroban, cleocin and Bactrim. She now has a bite on the other hand is finished with meds. It is a puncture wound. She is using the bactroban but is asking if she needs the oral antibiotics again. It is not red, hot swollen.     [facility] [address] [facility]

## 2024-05-08 DIAGNOSIS — I10 HYPERTENSION, UNSPECIFIED TYPE: ICD-10-CM

## 2024-05-08 RX ORDER — LOSARTAN POTASSIUM 50 MG/1
50 TABLET ORAL DAILY
Qty: 90 TABLET | Refills: 1 | Status: SHIPPED | OUTPATIENT
Start: 2024-05-08

## 2024-06-06 ENCOUNTER — OFFICE VISIT (OUTPATIENT)
Dept: FAMILY MEDICINE CLINIC | Age: 64
End: 2024-06-06
Payer: COMMERCIAL

## 2024-06-06 VITALS
HEART RATE: 78 BPM | HEIGHT: 64 IN | OXYGEN SATURATION: 99 % | DIASTOLIC BLOOD PRESSURE: 70 MMHG | WEIGHT: 146 LBS | RESPIRATION RATE: 10 BRPM | SYSTOLIC BLOOD PRESSURE: 114 MMHG | BODY MASS INDEX: 24.92 KG/M2

## 2024-06-06 DIAGNOSIS — E66.3 OVERWEIGHT WITH BODY MASS INDEX (BMI) OF 27 TO 27.9 IN ADULT: ICD-10-CM

## 2024-06-06 DIAGNOSIS — E03.9 HYPOTHYROIDISM, UNSPECIFIED TYPE: ICD-10-CM

## 2024-06-06 DIAGNOSIS — Z12.31 ENCOUNTER FOR SCREENING MAMMOGRAM FOR MALIGNANT NEOPLASM OF BREAST: ICD-10-CM

## 2024-06-06 DIAGNOSIS — I10 HYPERTENSION, UNSPECIFIED TYPE: Primary | ICD-10-CM

## 2024-06-06 DIAGNOSIS — Z13.220 SCREENING CHOLESTEROL LEVEL: ICD-10-CM

## 2024-06-06 DIAGNOSIS — Z71.3 WEIGHT LOSS COUNSELING, ENCOUNTER FOR: ICD-10-CM

## 2024-06-06 PROCEDURE — 3078F DIAST BP <80 MM HG: CPT | Performed by: NURSE PRACTITIONER

## 2024-06-06 PROCEDURE — 3074F SYST BP LT 130 MM HG: CPT | Performed by: NURSE PRACTITIONER

## 2024-06-06 PROCEDURE — 99214 OFFICE O/P EST MOD 30 MIN: CPT | Performed by: NURSE PRACTITIONER

## 2024-06-06 RX ORDER — TIRZEPATIDE 7.5 MG/.5ML
7.5 INJECTION, SOLUTION SUBCUTANEOUS WEEKLY
Qty: 3 ML | Refills: 1 | Status: SHIPPED | OUTPATIENT
Start: 2024-06-06 | End: 2024-06-06 | Stop reason: CLARIF

## 2024-06-06 RX ORDER — TIRZEPATIDE 5 MG/.5ML
5 INJECTION, SOLUTION SUBCUTANEOUS WEEKLY
Qty: 3 ML | Refills: 0 | Status: SHIPPED | OUTPATIENT
Start: 2024-06-06 | End: 2024-06-06 | Stop reason: CLARIF

## 2024-06-06 SDOH — ECONOMIC STABILITY: FOOD INSECURITY: WITHIN THE PAST 12 MONTHS, THE FOOD YOU BOUGHT JUST DIDN'T LAST AND YOU DIDN'T HAVE MONEY TO GET MORE.: NEVER TRUE

## 2024-06-06 SDOH — ECONOMIC STABILITY: FOOD INSECURITY: WITHIN THE PAST 12 MONTHS, YOU WORRIED THAT YOUR FOOD WOULD RUN OUT BEFORE YOU GOT MONEY TO BUY MORE.: NEVER TRUE

## 2024-06-06 SDOH — ECONOMIC STABILITY: INCOME INSECURITY: HOW HARD IS IT FOR YOU TO PAY FOR THE VERY BASICS LIKE FOOD, HOUSING, MEDICAL CARE, AND HEATING?: NOT HARD AT ALL

## 2024-06-06 ASSESSMENT — ENCOUNTER SYMPTOMS
ABDOMINAL DISTENTION: 0
ABDOMINAL PAIN: 0
WHEEZING: 0
CHEST TIGHTNESS: 0
COUGH: 0
BACK PAIN: 0
SHORTNESS OF BREATH: 0

## 2024-06-06 NOTE — TELEPHONE ENCOUNTER
Patient informed. Left detailed message on machine.     Please open this encounter and sign pended Rx.

## 2024-06-06 NOTE — PROGRESS NOTES
[facility] PROFESSIONAL SERV[facility]  [facility]  [address] [address]  Dept: [phone]  Dept Fax: [phone]  Loc: [phone]  PROGRESS NOTE      VisitDate: 2024    Loretta Chavez is a 63 y.o. female who presents today for:     Chief Complaint   Patient presents with    6 Month Follow-Up     6 month check up on Wegovy. She may have hit a plateau with her weight.          Subjective:  3-month follow-up Wegovy therapy weight loss counseling.  Patient reports plateaued for weight loss.  Requesting alternative.  Due for routine labs mammogram.  History of basal cell carcinoma hypertension obesity hypothyroid.          Review of Systems   Constitutional:  Negative for activity change, appetite change, chills, fatigue and fever.   Eyes:  Negative for visual disturbance.   Respiratory:  Negative for cough, chest tightness, shortness of breath and wheezing.    Cardiovascular:  Negative for chest pain, palpitations and leg swelling.   Gastrointestinal:  Negative for abdominal distention and abdominal pain.   Genitourinary:  Negative for dysuria.   Musculoskeletal:  Negative for arthralgias, back pain and neck pain.   Skin: Negative.  Negative for rash.   Neurological:  Negative for dizziness, light-headedness and headaches.   Hematological:  Negative for adenopathy.   Psychiatric/Behavioral:  Negative for decreased concentration and dysphoric mood.    All other systems reviewed and are negative.    Past Medical History:   Diagnosis Date    Carcinoma, basal cell, skin     Hypertension       Past Surgical History:   Procedure Laterality Date    BACK SURGERY      3/8/22, OrthoNeuro     SECTION      x3    SKIN BIOPSY      basal cell     WISDOM TOOTH EXTRACTION       Family History   Problem Relation Age of Onset    High Blood Pressure Mother     Celiac Disease Mother     High Blood Pressure Father     Heart Attack Father     Arthritis Brother     Breast Cancer

## 2024-06-06 NOTE — TELEPHONE ENCOUNTER
[facility] has denied Zepbound, she must meet certain initial BMI requirements and she does not.     Her initial BMI must be 30/kg/m2 or 27 kg/m2 with one or more weight related condition. The highest her BMI has been since we have seen her is 26.61.

## 2024-06-07 RX ORDER — SEMAGLUTIDE 2.4 MG/.75ML
2.4 INJECTION, SOLUTION SUBCUTANEOUS
Qty: 3 ML | Refills: 2 | Status: SHIPPED | OUTPATIENT
Start: 2024-06-07

## 2024-08-05 DIAGNOSIS — E03.9 HYPOTHYROIDISM, UNSPECIFIED TYPE: ICD-10-CM

## 2024-08-05 RX ORDER — POLYMYXIN B SULFATE AND TRIMETHOPRIM 1; 10000 MG/ML; [USP'U]/ML
1 SOLUTION OPHTHALMIC EVERY 4 HOURS
Qty: 10 ML | OUTPATIENT
Start: 2024-08-05 | End: 2024-08-15

## 2024-08-05 RX ORDER — LEVOTHYROXINE SODIUM 0.05 MG/1
TABLET ORAL
Qty: 90 TABLET | Refills: 0 | Status: SHIPPED | OUTPATIENT
Start: 2024-08-05

## 2024-08-06 LAB
ALBUMIN: 4.3 G/DL (ref 3.5–5.2)
ALK PHOSPHATASE: 76 U/L (ref 40–140)
ALT SERPL-CCNC: 26 U/L (ref 5–40)
ANION GAP SERPL CALCULATED.3IONS-SCNC: 9 MEQ/L (ref 7–16)
AST SERPL-CCNC: 31 U/L (ref 9–40)
BILIRUB SERPL-MCNC: 0.7 MG/DL
BUN BLDV-MCNC: 16 MG/DL (ref 8–23)
CALCIUM SERPL-MCNC: 9.5 MG/DL (ref 8.5–10.5)
CHLORIDE BLD-SCNC: 102 MEQ/L (ref 95–107)
CHOLESTEROL, TOTAL: 194 MG/DL
CHOLESTEROL/HDL RATIO: 2.6 RATIO
CO2: 27 MEQ/L (ref 19–31)
CREAT SERPL-MCNC: 1.11 MG/DL (ref 0.6–1.3)
EGFR IF NONAFRICAN AMERICAN: 56 ML/MIN/1.73
GLUCOSE: 86 MG/DL (ref 70–99)
HDLC SERPL-MCNC: 75 MG/DL
LDL CHOLESTEROL: 105 MG/DL
LDL/HDL RATIO: 1.4 RATIO
POTASSIUM SERPL-SCNC: 4.7 MEQ/L (ref 3.5–5.4)
SODIUM BLD-SCNC: 138 MEQ/L (ref 133–146)
T4 FREE: 1.33 NG/DL (ref 0.8–1.9)
TOTAL PROTEIN: 6.6 G/DL (ref 6.1–8.3)
TRIGL SERPL-MCNC: 72 MG/DL
TSH SERPL DL<=0.05 MIU/L-ACNC: 1.55 UIU/ML (ref 0.4–4.1)
VLDLC SERPL CALC-MCNC: 14 MG/DL

## 2024-09-03 ENCOUNTER — OFFICE VISIT (OUTPATIENT)
Dept: FAMILY MEDICINE CLINIC | Age: 64
End: 2024-09-03
Payer: COMMERCIAL

## 2024-09-03 VITALS
DIASTOLIC BLOOD PRESSURE: 76 MMHG | WEIGHT: 146.8 LBS | TEMPERATURE: 98 F | OXYGEN SATURATION: 99 % | BODY MASS INDEX: 25.6 KG/M2 | SYSTOLIC BLOOD PRESSURE: 124 MMHG | HEART RATE: 82 BPM

## 2024-09-03 DIAGNOSIS — M76.31 ILIOTIBIAL BAND SYNDROME OF RIGHT SIDE: ICD-10-CM

## 2024-09-03 DIAGNOSIS — S76.911A MUSCLE STRAIN OF RIGHT THIGH, INITIAL ENCOUNTER: Primary | ICD-10-CM

## 2024-09-03 PROCEDURE — 99213 OFFICE O/P EST LOW 20 MIN: CPT | Performed by: NURSE PRACTITIONER

## 2024-09-03 RX ORDER — PREDNISONE 5 MG/1
TABLET ORAL
Qty: 30 TABLET | Refills: 0 | Status: SHIPPED | OUTPATIENT
Start: 2024-09-03 | End: 2024-09-13

## 2024-09-03 RX ORDER — LIDOCAINE 40 MG/G
CREAM TOPICAL
Qty: 120 G | Refills: 0 | Status: SHIPPED | OUTPATIENT
Start: 2024-09-03

## 2024-09-03 ASSESSMENT — ENCOUNTER SYMPTOMS
SHORTNESS OF BREATH: 0
COUGH: 0
ABDOMINAL DISTENTION: 0
CHEST TIGHTNESS: 0
BACK PAIN: 0
WHEEZING: 0
ABDOMINAL PAIN: 0

## 2024-09-03 NOTE — PROGRESS NOTES
[facility] PROFESSIONAL SERVS  [facility]  [address] [address]  Dept: [phone]  Dept Fax: [phone]  Loc: [phone]  PROGRESS NOTE      VisitDate: 9/3/2024    Loretta Chavez is a 64 y.o. female who presents today for:     Chief Complaint   Patient presents with    Leg Pain     R thigh pain that has been going on for about a week, did go to [place] to visit family and was walking hills a lot. Laying down makes the pain worse. Burning constant pain          Subjective:  Patient presents with complaint of right lateral thigh pain past week.  Denies any specific injury.  Patient does report increased activity and walking he will.  History of basal cell skin cancer hypertension.  Denies any bruising edema swelling deformity.  Taking Aleve with minimal relief.  Patient reports difficulty sleeping due to discomfort.          Review of Systems   Constitutional:  Negative for activity change, appetite change, chills, fatigue and fever.   Eyes:  Negative for visual disturbance.   Respiratory:  Negative for cough, chest tightness, shortness of breath and wheezing.    Cardiovascular:  Negative for chest pain, palpitations and leg swelling.   Gastrointestinal:  Negative for abdominal distention and abdominal pain.   Genitourinary:  Negative for dysuria.   Musculoskeletal:  Negative for arthralgias, back pain and neck pain.   Skin: Negative.  Negative for rash.   Neurological:  Negative for dizziness, light-headedness and headaches.   Hematological:  Negative for adenopathy.   Psychiatric/Behavioral:  Negative for decreased concentration and dysphoric mood.    All other systems reviewed and are negative.    Past Medical History:   Diagnosis Date    Carcinoma, basal cell, skin     Hypertension       Past Surgical History:   Procedure Laterality Date    BACK SURGERY      3/8/22, OrthoNeuro     SECTION      x3    SKIN BIOPSY      basal cell     WISDOM TOOTH

## 2024-09-23 RX ORDER — NIACINAMIDE 500 MG
TABLET ORAL
Qty: 180 TABLET | Refills: 3 | Status: SHIPPED | OUTPATIENT
Start: 2024-09-23

## 2024-09-30 RX ORDER — MELOXICAM 15 MG/1
15 TABLET ORAL DAILY
Qty: 90 TABLET | Refills: 3 | Status: SHIPPED | OUTPATIENT
Start: 2024-09-30

## 2024-10-23 DIAGNOSIS — I10 HYPERTENSION, UNSPECIFIED TYPE: ICD-10-CM

## 2024-10-23 DIAGNOSIS — E03.9 HYPOTHYROIDISM, UNSPECIFIED TYPE: ICD-10-CM

## 2024-10-23 RX ORDER — LEVOTHYROXINE SODIUM 50 UG/1
TABLET ORAL
Qty: 90 TABLET | Refills: 1 | Status: SHIPPED | OUTPATIENT
Start: 2024-10-23

## 2024-10-23 RX ORDER — LOSARTAN POTASSIUM 50 MG/1
50 TABLET ORAL DAILY
Qty: 90 TABLET | Refills: 1 | Status: SHIPPED | OUTPATIENT
Start: 2024-10-23

## 2024-10-23 RX ORDER — POLYMYXIN B SULFATE AND TRIMETHOPRIM 1; 10000 MG/ML; [USP'U]/ML
1 SOLUTION OPHTHALMIC EVERY 4 HOURS
Qty: 10 ML | OUTPATIENT
Start: 2024-10-23 | End: 2024-11-02

## 2024-10-23 RX ORDER — MUPIROCIN 20 MG/G
OINTMENT TOPICAL
Qty: 22 G | OUTPATIENT
Start: 2024-10-23

## 2024-10-23 RX ORDER — SEMAGLUTIDE 2.4 MG/.75ML
2.4 INJECTION, SOLUTION SUBCUTANEOUS
Refills: 2 | OUTPATIENT
Start: 2024-10-23

## 2024-10-24 RX ORDER — SEMAGLUTIDE 2.4 MG/.75ML
2.4 INJECTION, SOLUTION SUBCUTANEOUS
Qty: 3 ML | Refills: 2 | Status: SHIPPED | OUTPATIENT
Start: 2024-10-24

## 2024-10-24 NOTE — TELEPHONE ENCOUNTER
LOV 09/03/2024    THIS WAS REFUSED YESTERDAY STATING THE DOSE WAS ADJUSTED BUT NO NEW PRESCRIPTION WAS SENT IN PLEASE ADVISE.

## 2024-11-09 ENCOUNTER — PATIENT MESSAGE (OUTPATIENT)
Dept: FAMILY MEDICINE CLINIC | Age: 64
End: 2024-11-09

## 2024-11-12 ENCOUNTER — IMMUNIZATION (OUTPATIENT)
Dept: FAMILY MEDICINE CLINIC | Age: 64
End: 2024-11-12
Payer: COMMERCIAL

## 2024-11-12 DIAGNOSIS — Z23 FLU VACCINE NEED: Primary | ICD-10-CM

## 2024-11-12 PROCEDURE — 90661 CCIIV3 VAC ABX FR 0.5 ML IM: CPT | Performed by: NURSE PRACTITIONER

## 2024-11-12 PROCEDURE — 90471 IMMUNIZATION ADMIN: CPT | Performed by: NURSE PRACTITIONER

## 2024-11-12 NOTE — PROGRESS NOTES
Immunizations Administered       Name Date Dose Route    Influenza, FLUCELVAX, (age 6 mo+) IM, Trivalent PF, 0.5mL 11/12/2024 0.5 mL Intramuscular    Site: Deltoid- Left    Lot: [id number]    NDC: 97661-718-62

## 2025-01-13 RX ORDER — SEMAGLUTIDE 2.4 MG/.75ML
2.4 INJECTION, SOLUTION SUBCUTANEOUS
Qty: 3 ML | Refills: 2 | Status: SHIPPED | OUTPATIENT
Start: 2025-01-13

## 2025-04-07 RX ORDER — SEMAGLUTIDE 2.4 MG/.75ML
2.4 INJECTION, SOLUTION SUBCUTANEOUS
Qty: 3 ML | Refills: 0 | Status: SHIPPED | OUTPATIENT
Start: 2025-04-07

## 2025-07-04 ENCOUNTER — PATIENT MESSAGE (OUTPATIENT)
Dept: FAMILY MEDICINE CLINIC | Age: 65
End: 2025-07-04